# Patient Record
Sex: FEMALE | Race: WHITE | Employment: OTHER | URBAN - METROPOLITAN AREA
[De-identification: names, ages, dates, MRNs, and addresses within clinical notes are randomized per-mention and may not be internally consistent; named-entity substitution may affect disease eponyms.]

---

## 2017-03-29 PROBLEM — J01.90 ACUTE SINUSITIS, RECURRENCE NOT SPECIFIED, UNSPECIFIED LOCATION: Status: ACTIVE | Noted: 2017-03-29

## 2018-01-08 PROBLEM — J01.90 ACUTE SINUSITIS, RECURRENCE NOT SPECIFIED, UNSPECIFIED LOCATION: Status: RESOLVED | Noted: 2017-03-29 | Resolved: 2018-01-08

## 2018-04-30 PROBLEM — H54.7 POOR VISION: Status: ACTIVE | Noted: 2018-04-30

## 2018-06-19 PROBLEM — H25.13 NUCLEAR SENILE CATARACT OF BOTH EYES: Status: ACTIVE | Noted: 2018-06-19

## 2018-07-18 PROBLEM — H25.13 NUCLEAR SENILE CATARACT OF BOTH EYES: Status: RESOLVED | Noted: 2018-06-19 | Resolved: 2018-07-18

## 2018-07-18 PROBLEM — M16.12 PRIMARY OSTEOARTHRITIS OF LEFT HIP: Status: ACTIVE | Noted: 2018-07-18

## 2018-07-18 PROBLEM — H54.7 POOR VISION: Status: RESOLVED | Noted: 2018-04-30 | Resolved: 2018-07-18

## 2018-07-18 PROCEDURE — 87081 CULTURE SCREEN ONLY: CPT | Performed by: ORTHOPAEDIC SURGERY

## 2018-07-31 ENCOUNTER — LAB ENCOUNTER (OUTPATIENT)
Dept: LAB | Age: 83
End: 2018-07-31
Attending: ORTHOPAEDIC SURGERY
Payer: MEDICARE

## 2018-07-31 DIAGNOSIS — Z01.818 PREOP TESTING: ICD-10-CM

## 2018-07-31 LAB
ANTIBODY SCREEN: NEGATIVE
RH BLOOD TYPE: NEGATIVE

## 2018-07-31 PROCEDURE — 86900 BLOOD TYPING SEROLOGIC ABO: CPT

## 2018-07-31 PROCEDURE — 86850 RBC ANTIBODY SCREEN: CPT

## 2018-07-31 PROCEDURE — 86901 BLOOD TYPING SEROLOGIC RH(D): CPT

## 2018-07-31 PROCEDURE — 36415 COLL VENOUS BLD VENIPUNCTURE: CPT

## 2018-08-15 NOTE — H&P
[facility]    PATIENT'S NAME: Mir Thea   ATTENDING PHYSICIAN: Clem Marrero MD   PATIENT ACCOUNT#:   [id number]    LOCATION:  [place] [address] #:   [id number]       YOB: 1935  ADMISSION DATE:       08/16/2 NEUROLOGIC:  She is neurologically intact distally. SKIN:  Intact. Radiographs demonstrate end-stage primary osteoarthritis of the left hip with decreased joint space and large osteophytes surrounding the acetabulum and femoral neck.   Minimal leg le

## 2018-08-16 ENCOUNTER — ANESTHESIA EVENT (OUTPATIENT)
Dept: SURGERY | Facility: HOSPITAL | Age: 83
DRG: 470 | End: 2018-08-16
Payer: MEDICARE

## 2018-08-16 ENCOUNTER — ANESTHESIA (OUTPATIENT)
Dept: SURGERY | Facility: HOSPITAL | Age: 83
DRG: 470 | End: 2018-08-16
Payer: MEDICARE

## 2018-08-16 ENCOUNTER — SURGERY (OUTPATIENT)
Age: 83
End: 2018-08-16

## 2018-08-16 ENCOUNTER — HOSPITAL ENCOUNTER (INPATIENT)
Facility: HOSPITAL | Age: 83
LOS: 4 days | Discharge: NURSING, REHAB OR LONG-TERM CARE | DRG: 470 | End: 2018-08-20
Attending: ORTHOPAEDIC SURGERY | Admitting: ORTHOPAEDIC SURGERY
Payer: MEDICARE

## 2018-08-16 ENCOUNTER — APPOINTMENT (OUTPATIENT)
Dept: GENERAL RADIOLOGY | Facility: HOSPITAL | Age: 83
DRG: 470 | End: 2018-08-16
Attending: PHYSICIAN ASSISTANT
Payer: MEDICARE

## 2018-08-16 DIAGNOSIS — M16.12 PRIMARY OSTEOARTHRITIS OF LEFT HIP: ICD-10-CM

## 2018-08-16 DIAGNOSIS — Z01.818 PREOP TESTING: Primary | ICD-10-CM

## 2018-08-16 PROCEDURE — 88311 DECALCIFY TISSUE: CPT | Performed by: ORTHOPAEDIC SURGERY

## 2018-08-16 PROCEDURE — 73501 X-RAY EXAM HIP UNI 1 VIEW: CPT | Performed by: PHYSICIAN ASSISTANT

## 2018-08-16 PROCEDURE — 0SRB0JA REPLACEMENT OF LEFT HIP JOINT WITH SYNTHETIC SUBSTITUTE, UNCEMENTED, OPEN APPROACH: ICD-10-PCS | Performed by: ORTHOPAEDIC SURGERY

## 2018-08-16 PROCEDURE — 97162 PT EVAL MOD COMPLEX 30 MIN: CPT

## 2018-08-16 PROCEDURE — 88305 TISSUE EXAM BY PATHOLOGIST: CPT | Performed by: ORTHOPAEDIC SURGERY

## 2018-08-16 PROCEDURE — 97530 THERAPEUTIC ACTIVITIES: CPT

## 2018-08-16 DEVICE — CONT LONG NEU LINER HH 32X50: Type: IMPLANTABLE DEVICE | Site: HIP | Status: FUNCTIONAL

## 2018-08-16 DEVICE — IMPLANTABLE DEVICE: Type: IMPLANTABLE DEVICE | Site: HIP | Status: FUNCTIONAL

## 2018-08-16 DEVICE — BONE SCREW 6.5X30 SELF-TAP: Type: IMPLANTABLE DEVICE | Site: HIP | Status: FUNCTIONAL

## 2018-08-16 DEVICE — BONE SCREW 6.5X15 SELF-TAP: Type: IMPLANTABLE DEVICE | Site: HIP | Status: FUNCTIONAL

## 2018-08-16 DEVICE — 12/14 COCR FEM HEAD 32MM +0: Type: IMPLANTABLE DEVICE | Site: HIP | Status: FUNCTIONAL

## 2018-08-16 DEVICE — M/L TAP 12.5 EXT: Type: IMPLANTABLE DEVICE | Site: HIP | Status: FUNCTIONAL

## 2018-08-16 RX ORDER — DIPHENHYDRAMINE HYDROCHLORIDE 50 MG/ML
12.5 INJECTION INTRAMUSCULAR; INTRAVENOUS EVERY 4 HOURS PRN
Status: DISCONTINUED | OUTPATIENT
Start: 2018-08-16 | End: 2018-08-18

## 2018-08-16 RX ORDER — MAGNESIUM HYDROXIDE 1200 MG/15ML
LIQUID ORAL CONTINUOUS PRN
Status: DISCONTINUED | OUTPATIENT
Start: 2018-08-16 | End: 2018-08-16

## 2018-08-16 RX ORDER — LABETALOL HYDROCHLORIDE 5 MG/ML
5 INJECTION, SOLUTION INTRAVENOUS EVERY 5 MIN PRN
Status: DISCONTINUED | OUTPATIENT
Start: 2018-08-16 | End: 2018-08-16 | Stop reason: HOSPADM

## 2018-08-16 RX ORDER — CEFAZOLIN SODIUM/WATER 2 G/20 ML
2 SYRINGE (ML) INTRAVENOUS EVERY 8 HOURS
Status: COMPLETED | OUTPATIENT
Start: 2018-08-16 | End: 2018-08-16

## 2018-08-16 RX ORDER — ONDANSETRON 2 MG/ML
4 INJECTION INTRAMUSCULAR; INTRAVENOUS ONCE AS NEEDED
Status: DISCONTINUED | OUTPATIENT
Start: 2018-08-16 | End: 2018-08-16 | Stop reason: HOSPADM

## 2018-08-16 RX ORDER — CEFAZOLIN SODIUM/WATER 2 G/20 ML
2 SYRINGE (ML) INTRAVENOUS ONCE
Status: COMPLETED | OUTPATIENT
Start: 2018-08-16 | End: 2018-08-16

## 2018-08-16 RX ORDER — ACETAMINOPHEN 500 MG
1000 TABLET ORAL ONCE
Status: ON HOLD | COMMUNITY
Start: 2018-08-16 | End: 2018-08-20

## 2018-08-16 RX ORDER — HYDROCODONE BITARTRATE AND ACETAMINOPHEN 5; 325 MG/1; MG/1
1 TABLET ORAL AS NEEDED
Status: DISCONTINUED | OUTPATIENT
Start: 2018-08-16 | End: 2018-08-16 | Stop reason: HOSPADM

## 2018-08-16 RX ORDER — DIPHENHYDRAMINE HYDROCHLORIDE 50 MG/ML
25 INJECTION INTRAMUSCULAR; INTRAVENOUS ONCE AS NEEDED
Status: ACTIVE | OUTPATIENT
Start: 2018-08-16 | End: 2018-08-16

## 2018-08-16 RX ORDER — ATORVASTATIN CALCIUM 10 MG/1
10 TABLET, FILM COATED ORAL DAILY
Status: DISCONTINUED | OUTPATIENT
Start: 2018-08-16 | End: 2018-08-20

## 2018-08-16 RX ORDER — BUPIVACAINE HYDROCHLORIDE 7.5 MG/ML
INJECTION, SOLUTION INTRASPINAL AS NEEDED
Status: DISCONTINUED | OUTPATIENT
Start: 2018-08-16 | End: 2018-08-16 | Stop reason: SURG

## 2018-08-16 RX ORDER — HYDROCODONE BITARTRATE AND ACETAMINOPHEN 10; 325 MG/1; MG/1
2 TABLET ORAL EVERY 4 HOURS PRN
Status: DISCONTINUED | OUTPATIENT
Start: 2018-08-16 | End: 2018-08-17

## 2018-08-16 RX ORDER — GABAPENTIN 300 MG/1
300 CAPSULE ORAL NIGHTLY
Status: DISCONTINUED | OUTPATIENT
Start: 2018-08-16 | End: 2018-08-20

## 2018-08-16 RX ORDER — HYDRALAZINE HYDROCHLORIDE 20 MG/ML
5 INJECTION INTRAMUSCULAR; INTRAVENOUS EVERY 10 MIN PRN
Status: DISCONTINUED | OUTPATIENT
Start: 2018-08-16 | End: 2018-08-16 | Stop reason: HOSPADM

## 2018-08-16 RX ORDER — METOCLOPRAMIDE 10 MG/1
10 TABLET ORAL ONCE
Status: DISCONTINUED | OUTPATIENT
Start: 2018-08-16 | End: 2018-08-16 | Stop reason: HOSPADM

## 2018-08-16 RX ORDER — CELECOXIB 200 MG/1
200 CAPSULE ORAL EVERY 12 HOURS SCHEDULED
Status: DISCONTINUED | OUTPATIENT
Start: 2018-08-17 | End: 2018-08-20

## 2018-08-16 RX ORDER — ONDANSETRON 2 MG/ML
INJECTION INTRAMUSCULAR; INTRAVENOUS AS NEEDED
Status: DISCONTINUED | OUTPATIENT
Start: 2018-08-16 | End: 2018-08-16 | Stop reason: SURG

## 2018-08-16 RX ORDER — NALOXONE HYDROCHLORIDE 0.4 MG/ML
80 INJECTION, SOLUTION INTRAMUSCULAR; INTRAVENOUS; SUBCUTANEOUS AS NEEDED
Status: DISCONTINUED | OUTPATIENT
Start: 2018-08-16 | End: 2018-08-16 | Stop reason: HOSPADM

## 2018-08-16 RX ORDER — DOCUSATE SODIUM 100 MG/1
100 CAPSULE, LIQUID FILLED ORAL 2 TIMES DAILY
Status: DISCONTINUED | OUTPATIENT
Start: 2018-08-16 | End: 2018-08-20

## 2018-08-16 RX ORDER — SODIUM CHLORIDE 0.9 % (FLUSH) 0.9 %
10 SYRINGE (ML) INJECTION AS NEEDED
Status: DISCONTINUED | OUTPATIENT
Start: 2018-08-16 | End: 2018-08-20

## 2018-08-16 RX ORDER — NALOXONE HYDROCHLORIDE 0.4 MG/ML
80 INJECTION, SOLUTION INTRAMUSCULAR; INTRAVENOUS; SUBCUTANEOUS AS NEEDED
Status: DISCONTINUED | OUTPATIENT
Start: 2018-08-16 | End: 2018-08-16

## 2018-08-16 RX ORDER — MIDAZOLAM HYDROCHLORIDE 1 MG/ML
INJECTION INTRAMUSCULAR; INTRAVENOUS AS NEEDED
Status: DISCONTINUED | OUTPATIENT
Start: 2018-08-16 | End: 2018-08-16 | Stop reason: SURG

## 2018-08-16 RX ORDER — MORPHINE SULFATE 4 MG/ML
4 INJECTION, SOLUTION INTRAMUSCULAR; INTRAVENOUS EVERY 10 MIN PRN
Status: DISCONTINUED | OUTPATIENT
Start: 2018-08-16 | End: 2018-08-16 | Stop reason: HOSPADM

## 2018-08-16 RX ORDER — POLYETHYLENE GLYCOL 3350 17 G/17G
17 POWDER, FOR SOLUTION ORAL DAILY PRN
Status: DISCONTINUED | OUTPATIENT
Start: 2018-08-16 | End: 2018-08-20

## 2018-08-16 RX ORDER — MORPHINE SULFATE 4 MG/ML
2 INJECTION, SOLUTION INTRAMUSCULAR; INTRAVENOUS EVERY 10 MIN PRN
Status: DISCONTINUED | OUTPATIENT
Start: 2018-08-16 | End: 2018-08-16 | Stop reason: HOSPADM

## 2018-08-16 RX ORDER — BUPIVACAINE HYDROCHLORIDE 7.5 MG/ML
INJECTION, SOLUTION INTRASPINAL AS NEEDED
Status: DISCONTINUED | OUTPATIENT
Start: 2018-08-16 | End: 2018-08-16

## 2018-08-16 RX ORDER — ONDANSETRON 2 MG/ML
4 INJECTION INTRAMUSCULAR; INTRAVENOUS AS NEEDED
Status: DISCONTINUED | OUTPATIENT
Start: 2018-08-16 | End: 2018-08-16 | Stop reason: HOSPADM

## 2018-08-16 RX ORDER — HYDROCODONE BITARTRATE AND ACETAMINOPHEN 5; 325 MG/1; MG/1
2 TABLET ORAL AS NEEDED
Status: DISCONTINUED | OUTPATIENT
Start: 2018-08-16 | End: 2018-08-16 | Stop reason: HOSPADM

## 2018-08-16 RX ORDER — PHENYLEPHRINE HCL 10 MG/ML
VIAL (ML) INJECTION AS NEEDED
Status: DISCONTINUED | OUTPATIENT
Start: 2018-08-16 | End: 2018-08-16 | Stop reason: SURG

## 2018-08-16 RX ORDER — SODIUM PHOSPHATE, DIBASIC AND SODIUM PHOSPHATE, MONOBASIC 7; 19 G/133ML; G/133ML
1 ENEMA RECTAL ONCE AS NEEDED
Status: COMPLETED | OUTPATIENT
Start: 2018-08-16 | End: 2018-08-18

## 2018-08-16 RX ORDER — ONDANSETRON 2 MG/ML
4 INJECTION INTRAMUSCULAR; INTRAVENOUS EVERY 4 HOURS PRN
Status: DISCONTINUED | OUTPATIENT
Start: 2018-08-16 | End: 2018-08-20

## 2018-08-16 RX ORDER — ACETAMINOPHEN 500 MG
1000 TABLET ORAL ONCE
Status: DISCONTINUED | OUTPATIENT
Start: 2018-08-16 | End: 2018-08-16 | Stop reason: HOSPADM

## 2018-08-16 RX ORDER — ACETAMINOPHEN 500 MG
1000 TABLET ORAL ONCE AS NEEDED
Status: DISCONTINUED | OUTPATIENT
Start: 2018-08-16 | End: 2018-08-16 | Stop reason: HOSPADM

## 2018-08-16 RX ORDER — METOPROLOL TARTRATE 5 MG/5ML
2.5 INJECTION INTRAVENOUS ONCE
Status: DISCONTINUED | OUTPATIENT
Start: 2018-08-16 | End: 2018-08-16 | Stop reason: HOSPADM

## 2018-08-16 RX ORDER — SENNOSIDES 8.6 MG
17.2 TABLET ORAL NIGHTLY
Status: DISCONTINUED | OUTPATIENT
Start: 2018-08-16 | End: 2018-08-20

## 2018-08-16 RX ORDER — DIPHENHYDRAMINE HCL 25 MG
25 CAPSULE ORAL EVERY 4 HOURS PRN
Status: DISCONTINUED | OUTPATIENT
Start: 2018-08-16 | End: 2018-08-20

## 2018-08-16 RX ORDER — BISACODYL 10 MG
10 SUPPOSITORY, RECTAL RECTAL
Status: DISCONTINUED | OUTPATIENT
Start: 2018-08-16 | End: 2018-08-20

## 2018-08-16 RX ORDER — ASPIRIN 325 MG
325 TABLET ORAL 2 TIMES DAILY
Status: DISCONTINUED | OUTPATIENT
Start: 2018-08-16 | End: 2018-08-20

## 2018-08-16 RX ORDER — MORPHINE SULFATE 10 MG/ML
6 INJECTION, SOLUTION INTRAMUSCULAR; INTRAVENOUS EVERY 10 MIN PRN
Status: DISCONTINUED | OUTPATIENT
Start: 2018-08-16 | End: 2018-08-16 | Stop reason: HOSPADM

## 2018-08-16 RX ORDER — HYDROCODONE BITARTRATE AND ACETAMINOPHEN 10; 325 MG/1; MG/1
1 TABLET ORAL EVERY 4 HOURS PRN
Status: DISCONTINUED | OUTPATIENT
Start: 2018-08-16 | End: 2018-08-17

## 2018-08-16 RX ORDER — FAMOTIDINE 20 MG/1
20 TABLET ORAL ONCE
Status: DISCONTINUED | OUTPATIENT
Start: 2018-08-16 | End: 2018-08-16 | Stop reason: HOSPADM

## 2018-08-16 RX ORDER — METOCLOPRAMIDE HYDROCHLORIDE 5 MG/ML
10 INJECTION INTRAMUSCULAR; INTRAVENOUS EVERY 6 HOURS PRN
Status: DISCONTINUED | OUTPATIENT
Start: 2018-08-16 | End: 2018-08-18

## 2018-08-16 RX ORDER — SODIUM CHLORIDE, SODIUM LACTATE, POTASSIUM CHLORIDE, CALCIUM CHLORIDE 600; 310; 30; 20 MG/100ML; MG/100ML; MG/100ML; MG/100ML
INJECTION, SOLUTION INTRAVENOUS CONTINUOUS
Status: DISCONTINUED | OUTPATIENT
Start: 2018-08-16 | End: 2018-08-16 | Stop reason: HOSPADM

## 2018-08-16 RX ORDER — SODIUM CHLORIDE, SODIUM LACTATE, POTASSIUM CHLORIDE, CALCIUM CHLORIDE 600; 310; 30; 20 MG/100ML; MG/100ML; MG/100ML; MG/100ML
INJECTION, SOLUTION INTRAVENOUS CONTINUOUS
Status: DISCONTINUED | OUTPATIENT
Start: 2018-08-16 | End: 2018-08-17

## 2018-08-16 RX ADMIN — PHENYLEPHRINE HCL 100 MCG: 10 MG/ML VIAL (ML) INJECTION at 08:25:00

## 2018-08-16 RX ADMIN — PHENYLEPHRINE HCL 100 MCG: 10 MG/ML VIAL (ML) INJECTION at 08:11:00

## 2018-08-16 RX ADMIN — CEFAZOLIN SODIUM/WATER 2 G: 2 G/20 ML SYRINGE (ML) INTRAVENOUS at 07:45:00

## 2018-08-16 RX ADMIN — BUPIVACAINE HYDROCHLORIDE 1.2 ML: 7.5 INJECTION, SOLUTION INTRASPINAL at 07:39:00

## 2018-08-16 RX ADMIN — MIDAZOLAM HYDROCHLORIDE 2 MG: 1 INJECTION INTRAMUSCULAR; INTRAVENOUS at 07:26:00

## 2018-08-16 RX ADMIN — PHENYLEPHRINE HCL 100 MCG: 10 MG/ML VIAL (ML) INJECTION at 07:45:00

## 2018-08-16 RX ADMIN — SODIUM CHLORIDE, SODIUM LACTATE, POTASSIUM CHLORIDE, CALCIUM CHLORIDE: 600; 310; 30; 20 INJECTION, SOLUTION INTRAVENOUS at 09:08:00

## 2018-08-16 RX ADMIN — ONDANSETRON 4 MG: 2 INJECTION INTRAMUSCULAR; INTRAVENOUS at 08:56:00

## 2018-08-16 NOTE — CM/SW NOTE
STEVE met with the pt. And her dtr At bedside. The pt. Lives alone in a 1st floor senior apartment with no stairs. The pt. Reports being independent prior to admission with adls, ambulation and driving. The pt. Has 2 children, 1 in the area. The pt.  Is pl

## 2018-08-16 NOTE — ANESTHESIA POSTPROCEDURE EVALUATION
Patient: Julius Rose    Procedure Summary     Date:  08/16/18 Room / Location:  [address] [place] [place] [address] MAIN OR 12 / [address] [place] [address] MAIN OR    Anesthesia Start:  8305 Anesthesia Stop:      Procedure:  HIP TOTAL REPLACEMENT (Left Hip) Diagnosis:       Primary osteoarthritis of left Melolabial Transposition Flap Text: The defect edges were debeveled with a #15 scalpel blade.  Given the location of the defect and the proximity to free margins a melolabial flap was deemed most appropriate.  Using a sterile surgical marker, an appropriate melolabial transposition flap was drawn incorporating the defect.    The area thus outlined was incised deep to adipose tissue with a #15 scalpel blade.  The skin margins were undermined to an appropriate distance in all directions utilizing iris scissors.

## 2018-08-16 NOTE — ANESTHESIA PROCEDURE NOTES
Spinal Block  Performed by: Michael Jacob by: Claude Boas     Patient Location:  OR  Start Time:  8/16/2018 7:35 AM  End Time:  8/16/2018 7:40 AM  Site identification: surface landmarks    Reason for Block: surgical anesthesia    Anesth

## 2018-08-16 NOTE — INTERVAL H&P NOTE
Pre-op Diagnosis: Primary osteoarthritis of left hip [M16.12]    The above referenced H&P was reviewed by Bridget Jarquin MD on 8/16/2018, the patient was examined and no significant changes have occurred in the patient's condition since the H&P was per

## 2018-08-16 NOTE — PHYSICAL THERAPY NOTE
PHYSICAL THERAPY HIP EVALUATION - INPATIENT     Room Number: 203/041-L  Evaluation Date: 8/16/2018  Type of Evaluation: Initial  Physician Order: PT Eval and Treat    Presenting Problem: L LINDSEY - Posterior  Reason for Therapy: Mobility Dysfunction and Disch Medical History  Past Medical History:   Diagnosis Date   • Anxiety state    • Cataract    • High cholesterol    • Osteoarthritis    • Pneumonia due to organism 2017   • Pure hypercholesterolemia        Past Surgical History  Past Surgical History:  No manuel room?: A Little   -   Climbing 3-5 steps with a railing?: A Little     AM-PAC Score:  Raw Score: 16   PT Approx Degree of Impairment Score: 54.16%   Standardized Score (AM-PAC Scale): 40.78   CMS Modifier (G-Code): CK    FUNCTIONAL ABILITY STATUS  Gait Ass

## 2018-08-16 NOTE — BRIEF OP NOTE
Pre-Operative Diagnosis: Primary osteoarthritis of left hip [M16.12]     Post-Operative Diagnosis: Primary osteoarthritis of left hip [M16.12]      Procedure Performed:   Procedure(s):  LEFT TOTAL HIP ARTHROPLASTY    Surgeon(s) and Role:     * Reuben Farrell

## 2018-08-16 NOTE — H&P
[facility] Hospitalist Team  History and Physical     ASSESSMENT / PLAN:   81 yo female with hx S/P cataracts surgery, HL and OA who presents for hip surgery.  Pt is S/P Left Total Hip Arthroplasty, see below for details    S/P Left Total Hip Arthroplasty  -IV/PO BS+; nondistended; non tender;no masses;   GENITAL/: lynn   EXTREMITIES: left hip dressing D/I, no edema; no cyanosis;, no calf tenderness; peripheral pulses intact     PMH  Past Medical History:   Diagnosis Date   • Anxiety state    • Cataract    • Hig results for input(s): ALT, AST, ALB, AMYLASE, LIPASE, LDH in the last 168 hours. Invalid input(s): ALPHOS, TBIL, DBIL, TPROT    No results for input(s): TROP in the last 168 hours.     Radiology: Xr Hip W Or Wo Pelvis 1 View, Left (cpt=73501)    Result D

## 2018-08-16 NOTE — OPERATIVE REPORT
[facility]    PATIENT'S NAME: Shyam Davis   ATTENDING PHYSICIAN: Clem Scales MD   OPERATING PHYSICIAN: Clem Scales MD   PATIENT ACCOUNT#:   [id number]    LOCATION:  [facility] [address] PACU [facility] 10  MEDICAL RECORD #:   [id number] catheter followed by being placed in a left side superolateral position with all pressure points well padded. Time-out was performed. The left lower extremity was prepped and draped in usual sterile fashion.   The procedure was begun with a 10 cm incision serial broaching. Broaching was taken up to a 12.5 where a good solid fit was achieved. No calcar planing was necessary.   With the 12.5 M/L Taper stem as a trial, a variety of neck lengths and offsets were used, and it was determined to restore the cooper

## 2018-08-16 NOTE — ANESTHESIA PREPROCEDURE EVALUATION
Anesthesia PreOp Note    HPI:     Jigna Dykes is a 82year old female who presents for preoperative consultation requested by: Regina Melo MD    Date of Surgery: 8/16/2018    Procedure(s):  HIP TOTAL REPLACEMENT  Indication: Primary osteoarthr MD OLIVIA    Metoclopramide HCl (REGLAN) tab 10 mg 10 mg Oral Once Rosseau, Ceola Paget, MD    ceFAZolin sodium (ANCEF/KEFZOL) 2 GM/20ML premix IV syringe 2 g 2 g Intravenous Once Clem Farrell MD    Tranexamic Acid (CYKLOKAPRON) 3,000 mg in sodium chlorid Anesthesia ROS/Med Hx and Physical Exam     Patient summary reviewed and Nursing notes reviewed    Airway   Mallampati: I  TM distance: >3 FB  Neck ROM: full  Dental - normal exam     Pulmonary - normal exam   (+) COPD,   Cardiovascular - normal exam  Exer

## 2018-08-17 ENCOUNTER — APPOINTMENT (OUTPATIENT)
Dept: GENERAL RADIOLOGY | Facility: HOSPITAL | Age: 83
DRG: 470 | End: 2018-08-17
Attending: NURSE PRACTITIONER
Payer: MEDICARE

## 2018-08-17 LAB
ANION GAP SERPL CALC-SCNC: 7 MMOL/L (ref 0–18)
BUN SERPL-MCNC: 19 MG/DL (ref 8–20)
BUN/CREAT SERPL: 24.1 (ref 10–20)
CALCIUM SERPL-MCNC: 8.4 MG/DL (ref 8.5–10.5)
CHLORIDE SERPL-SCNC: 104 MMOL/L (ref 95–110)
CO2 SERPL-SCNC: 27 MMOL/L (ref 22–32)
CREAT SERPL-MCNC: 0.79 MG/DL (ref 0.5–1.5)
ERYTHROCYTE [DISTWIDTH] IN BLOOD BY AUTOMATED COUNT: 14.6 % (ref 11–15)
GLUCOSE SERPL-MCNC: 133 MG/DL (ref 70–99)
HCT VFR BLD AUTO: 33.7 % (ref 35–48)
HGB BLD-MCNC: 11.2 G/DL (ref 12–16)
MCH RBC QN AUTO: 29.9 PG (ref 27–32)
MCHC RBC AUTO-ENTMCNC: 33.3 G/DL (ref 32–37)
MCV RBC AUTO: 89.7 FL (ref 80–100)
OSMOLALITY UR CALC.SUM OF ELEC: 290 MOSM/KG (ref 275–295)
PLATELET # BLD AUTO: 235 K/UL (ref 140–400)
PMV BLD AUTO: 9.6 FL (ref 7.4–10.3)
POTASSIUM SERPL-SCNC: 4.4 MMOL/L (ref 3.3–5.1)
RBC # BLD AUTO: 3.76 M/UL (ref 3.7–5.4)
SODIUM SERPL-SCNC: 138 MMOL/L (ref 136–144)
WBC # BLD AUTO: 8 K/UL (ref 4–11)

## 2018-08-17 PROCEDURE — 85027 COMPLETE CBC AUTOMATED: CPT | Performed by: PHYSICIAN ASSISTANT

## 2018-08-17 PROCEDURE — 97116 GAIT TRAINING THERAPY: CPT

## 2018-08-17 PROCEDURE — 97110 THERAPEUTIC EXERCISES: CPT

## 2018-08-17 PROCEDURE — 80048 BASIC METABOLIC PNL TOTAL CA: CPT | Performed by: PHYSICIAN ASSISTANT

## 2018-08-17 PROCEDURE — 71046 X-RAY EXAM CHEST 2 VIEWS: CPT | Performed by: NURSE PRACTITIONER

## 2018-08-17 PROCEDURE — 97166 OT EVAL MOD COMPLEX 45 MIN: CPT

## 2018-08-17 PROCEDURE — 97535 SELF CARE MNGMENT TRAINING: CPT

## 2018-08-17 PROCEDURE — 94640 AIRWAY INHALATION TREATMENT: CPT

## 2018-08-17 RX ORDER — HYDROCODONE BITARTRATE AND ACETAMINOPHEN 5; 325 MG/1; MG/1
1 TABLET ORAL EVERY 4 HOURS PRN
Status: DISCONTINUED | OUTPATIENT
Start: 2018-08-17 | End: 2018-08-20

## 2018-08-17 RX ORDER — IPRATROPIUM BROMIDE AND ALBUTEROL SULFATE 2.5; .5 MG/3ML; MG/3ML
3 SOLUTION RESPIRATORY (INHALATION) EVERY 6 HOURS PRN
Status: DISCONTINUED | OUTPATIENT
Start: 2018-08-17 | End: 2018-08-20

## 2018-08-17 RX ORDER — TRAMADOL HYDROCHLORIDE 50 MG/1
50 TABLET ORAL EVERY 6 HOURS PRN
Status: DISCONTINUED | OUTPATIENT
Start: 2018-08-17 | End: 2018-08-20

## 2018-08-17 NOTE — PROGRESS NOTES
[facility] Hospitalist Team  Progress Note    Bakari Kam Patient Status:  Inpatient    1935 MRN [id number]   Location [facility] 4W/SW/SE Attending Colby Jarquin MD   Hosp Day # 1 PCP Anthony Rogers MD     CC: Follow Up  PCP: Lynn Fletcher 08/17/18   0524   WBC  8.0   HGB  11.2*   MCV  89.7   PLT  235       Recent Labs   Lab  08/17/18   0524   NA  138   K  4.4   CL  104   CO2  27   BUN  19   CREATSERUM  0.79   CA  8.4*   GLU  133*       No results for input(s): ALT, AST, ALB, AMYLASE, LIPASE Dictated by (CST): Patt Olszewski, MD on 8/16/2018 at 9:50     Approved by (CST): Patt Olszewski, MD on 8/16/2018 at 9:51              SEE ATTENDING NOTE BELOW:   Patient seen and examined independently. Discussed with APN and agree with note above.     S: p

## 2018-08-17 NOTE — PHYSICAL THERAPY NOTE
PHYSICAL THERAPY HIP TREATMENT NOTE - INPATIENT    Room Number: 472/319-L            Presenting Problem: L LINDSEY - Posterior    Problem List  Principal Problem:    Primary osteoarthritis of left hip  Active Problems:    Preop testing      ASSESSMENT   Mu -   Sitting down on and standing up from a chair with arms (e.g., wheelchair, bedside commode, etc.): A Little   -   Moving from lying on back to sitting on the side of the bed?: A Little   How much help from another person does the patient currently nee independently performs home exercise program for ROM/strengthening per the instructions provided in preparation for discharge.    Goal #4   Current Status IN PROGRESS   Goal #5 Patient verbalizes and/or demonstrates all precautions and safety concerns indep

## 2018-08-17 NOTE — PROGRESS NOTES
POD#1 s/p R LINDSEY  Pain well controlled on orals  No nausea  Ohara DC'ed    Xray reviewed  Labs with mild acute nausea - tolerated    Dressing clean and dry  Calf's non tender, DNVI    Imp: POD#1 s/p Right LINDSEY - stable    Plan: Rehab as per posterior hip pre

## 2018-08-17 NOTE — PLAN OF CARE
DISCHARGE PLANNING    • Discharge to home or other facility with appropriate resources Progressing        HEMATOLOGIC - ADULT    • Free from bleeding injury Progressing        MUSCULOSKELETAL - ADULT    • Return mobility to safest level of function Progres use.

## 2018-08-17 NOTE — OCCUPATIONAL THERAPY NOTE
OCCUPATIONAL THERAPY EVALUATION - INPATIENT      Room Number: 418/418-A  Evaluation Date: 8/17/2018  Type of Evaluation: Initial  Presenting Problem:  (s/p L LINDSEY)    Physician Order: IP Consult to Occupational Therapy  Reason for Therapy: ADL/IADL Dysfunct to organism 2017   • Pure hypercholesterolemia        Past Surgical History  Past Surgical History:  No date: CATARACT  08/16/2018: HIP REPLACEMENT SURGERY Left      Comment: Dr Cheryl Fiore  No date: HYSTERECTOMY    HOME SITUATION  Type of Home: Apartment Maxwell Felipe Score (AM-PAC Scale): 42.03  CMS Modifier (G-Code): CJ    FUNCTIONAL TRANSFER ASSESSMENT  Supine to Sit : Not tested  Sit to Stand:  (CGA)  Toilet Transfer: CGA  Shower Transfer: NT  Chair Transfer: CGA  Car Transfer: NT    Bedroom Mobility: CGA with use o

## 2018-08-18 LAB
ANION GAP SERPL CALC-SCNC: 5 MMOL/L (ref 0–18)
BUN SERPL-MCNC: 16 MG/DL (ref 8–20)
BUN/CREAT SERPL: 21.3 (ref 10–20)
CALCIUM SERPL-MCNC: 8.5 MG/DL (ref 8.5–10.5)
CHLORIDE SERPL-SCNC: 103 MMOL/L (ref 95–110)
CO2 SERPL-SCNC: 28 MMOL/L (ref 22–32)
CREAT SERPL-MCNC: 0.75 MG/DL (ref 0.5–1.5)
ERYTHROCYTE [DISTWIDTH] IN BLOOD BY AUTOMATED COUNT: 14.5 % (ref 11–15)
GLUCOSE SERPL-MCNC: 113 MG/DL (ref 70–99)
HCT VFR BLD AUTO: 33.2 % (ref 35–48)
HGB BLD-MCNC: 10.9 G/DL (ref 12–16)
MCH RBC QN AUTO: 29.9 PG (ref 27–32)
MCHC RBC AUTO-ENTMCNC: 32.8 G/DL (ref 32–37)
MCV RBC AUTO: 91.4 FL (ref 80–100)
OSMOLALITY UR CALC.SUM OF ELEC: 284 MOSM/KG (ref 275–295)
PLATELET # BLD AUTO: 209 K/UL (ref 140–400)
PMV BLD AUTO: 9.1 FL (ref 7.4–10.3)
POTASSIUM SERPL-SCNC: 4.3 MMOL/L (ref 3.3–5.1)
RBC # BLD AUTO: 3.63 M/UL (ref 3.7–5.4)
SODIUM SERPL-SCNC: 136 MMOL/L (ref 136–144)
WBC # BLD AUTO: 8.9 K/UL (ref 4–11)

## 2018-08-18 PROCEDURE — 97110 THERAPEUTIC EXERCISES: CPT

## 2018-08-18 PROCEDURE — 97116 GAIT TRAINING THERAPY: CPT

## 2018-08-18 PROCEDURE — A4216 STERILE WATER/SALINE, 10 ML: HCPCS | Performed by: PHYSICIAN ASSISTANT

## 2018-08-18 PROCEDURE — 85027 COMPLETE CBC AUTOMATED: CPT | Performed by: PHYSICIAN ASSISTANT

## 2018-08-18 PROCEDURE — 80048 BASIC METABOLIC PNL TOTAL CA: CPT | Performed by: NURSE PRACTITIONER

## 2018-08-18 NOTE — PROGRESS NOTES
[facility] Hospitalist Team  Progress Note    Shereen Crooks Patient Status:  Inpatient    1935 MRN [id number]   Location [facility] 4W/SW/SE Attending Jammie Linton MD   Hosp Day # 2 PCP Jamie Kilpatrick MD     CC: Follow Up  PCP: Mag Pena 136   K  4.4  4.3   CL  104  103   CO2  27  28   BUN  19  16   CREATSERUM  0.79  0.75   CA  8.4*  8.5   GLU  133*  113*       No results for input(s): ALT, AST, ALB, AMYLASE, LIPASE, LDH in the last 168 hours.     Invalid input(s): ALPHOS, TBIL, DBIL, TPRO immediate complication.      Dictated by (CST): Gloria Renee MD on 8/16/2018 at 9:50     Approved by (CST): Gloria Renee MD on 8/16/2018 at 9:51

## 2018-08-18 NOTE — PHYSICAL THERAPY NOTE
PHYSICAL THERAPY HIP TREATMENT NOTE - INPATIENT    Room Number: 741/127-R            Presenting Problem: L LINDSEY - Posterior    Problem List  Principal Problem:    Primary osteoarthritis of left hip  Active Problems:    Preop testing      ASSESSMENT   Pt was Fair  ACTIVITY TOLERANCE  Room air    AM-PAC '6-Clicks' INPATIENT SHORT FORM - BASIC MOBILITY  How much difficulty does the patient currently have. ..  -   Turning over in bed (including adjusting bedclothes, sheets and blankets)?: A Little   -   Sitting do Patient is able to ambulate 300 feet with assistive device at assistance level: modified independent    Goal #3   Current Status Amb 120 ft x 2 w/ RW & CGA   Goal #4 Patient independently performs home exercise program for ROM/strengthening per the instruc

## 2018-08-18 NOTE — PROGRESS NOTES
POD#2 s/p Left LINDSEY  Pain well controlled on orals  Rehab progressing    Labs stable    Dressing clean and dry  Calf's non tender, DNVI    Imp: Good POD#2 s/p Left LINDSEY    Plan: Awaiting rehab placement  Cont PT with posterior hip precautions  325 ASA for 4

## 2018-08-19 ENCOUNTER — APPOINTMENT (OUTPATIENT)
Dept: CT IMAGING | Facility: HOSPITAL | Age: 83
DRG: 470 | End: 2018-08-19
Attending: HOSPITALIST
Payer: MEDICARE

## 2018-08-19 ENCOUNTER — APPOINTMENT (OUTPATIENT)
Dept: ULTRASOUND IMAGING | Facility: HOSPITAL | Age: 83
DRG: 470 | End: 2018-08-19
Attending: HOSPITALIST
Payer: MEDICARE

## 2018-08-19 LAB
ANION GAP SERPL CALC-SCNC: 6 MMOL/L (ref 0–18)
BUN SERPL-MCNC: 19 MG/DL (ref 8–20)
BUN/CREAT SERPL: 22.6 (ref 10–20)
CALCIUM SERPL-MCNC: 8.6 MG/DL (ref 8.5–10.5)
CHLORIDE SERPL-SCNC: 98 MMOL/L (ref 95–110)
CO2 SERPL-SCNC: 29 MMOL/L (ref 22–32)
CREAT SERPL-MCNC: 0.84 MG/DL (ref 0.5–1.5)
D DIMER PPP FEU-MCNC: 2.95 MCG/ML (ref ?–0.81)
ERYTHROCYTE [DISTWIDTH] IN BLOOD BY AUTOMATED COUNT: 14.5 % (ref 11–15)
GLUCOSE SERPL-MCNC: 142 MG/DL (ref 70–99)
HCT VFR BLD AUTO: 35 % (ref 35–48)
HGB BLD-MCNC: 11.4 G/DL (ref 12–16)
MCH RBC QN AUTO: 29.7 PG (ref 27–32)
MCHC RBC AUTO-ENTMCNC: 32.6 G/DL (ref 32–37)
MCV RBC AUTO: 91 FL (ref 80–100)
OSMOLALITY UR CALC.SUM OF ELEC: 281 MOSM/KG (ref 275–295)
PLATELET # BLD AUTO: 228 K/UL (ref 140–400)
PMV BLD AUTO: 9.1 FL (ref 7.4–10.3)
POTASSIUM SERPL-SCNC: 4.5 MMOL/L (ref 3.3–5.1)
RBC # BLD AUTO: 3.85 M/UL (ref 3.7–5.4)
SODIUM SERPL-SCNC: 133 MMOL/L (ref 136–144)
WBC # BLD AUTO: 9.9 K/UL (ref 4–11)

## 2018-08-19 PROCEDURE — 97116 GAIT TRAINING THERAPY: CPT

## 2018-08-19 PROCEDURE — 85379 FIBRIN DEGRADATION QUANT: CPT | Performed by: HOSPITALIST

## 2018-08-19 PROCEDURE — 97110 THERAPEUTIC EXERCISES: CPT

## 2018-08-19 PROCEDURE — 71260 CT THORAX DX C+: CPT | Performed by: HOSPITALIST

## 2018-08-19 PROCEDURE — 85027 COMPLETE CBC AUTOMATED: CPT | Performed by: PHYSICIAN ASSISTANT

## 2018-08-19 PROCEDURE — 80048 BASIC METABOLIC PNL TOTAL CA: CPT | Performed by: HOSPITALIST

## 2018-08-19 PROCEDURE — 93970 EXTREMITY STUDY: CPT | Performed by: HOSPITALIST

## 2018-08-19 PROCEDURE — A4216 STERILE WATER/SALINE, 10 ML: HCPCS | Performed by: PHYSICIAN ASSISTANT

## 2018-08-19 NOTE — PHYSICAL THERAPY NOTE
PHYSICAL THERAPY HIP TREATMENT NOTE - INPATIENT    Room Number: 893/301-Y            Presenting Problem: L LINDSEY - Posterior    Problem List  Principal Problem:    Primary osteoarthritis of left hip  Active Problems:    Preop testing      ASSESSMENT   Pt see (including a wheelchair)?: A Little   -   Need to walk in hospital room?: A Little   -   Climbing 3-5 steps with a railing?: A Lot     AM-PAC Score:  Raw Score: 17   PT Approx Degree of Impairment Score: 50.57%   Standardized Score (AM-PAC Scale): 42.13 Goal #6  Current Status

## 2018-08-19 NOTE — PROGRESS NOTES
POD #3 s/p Left LINDSEY  Pain well controlled on orals  No nausea  Alert    Labs stable    Dressing clean and dry  Calf's non tender, DNVI    Imp: Ortho stable s/p Left LINDSEY    Plan:  To rehab when bed available  PT with posterior hip precautions  325 ASA for 4

## 2018-08-19 NOTE — PROGRESS NOTES
[facility] Hospitalist Team  Progress Note    Luis Linear Patient Status:  Inpatient    1935 MRN [id number]   Location [facility] 4W/SW/SE Attending Kendall Dorantes MD   Hosp Day # 3 PCP Radha Kenny MD     CC: Follow Up  PCP: Gisell Wall Labs   Lab  08/17/18   0524  08/18/18   0433  08/19/18   1043   NA  138  136  133*   K  4.4  4.3  4.5   CL  104  103  98   CO2  27  28  29   BUN  19  16  19   CREATSERUM  0.79  0.75  0.84   CA  8.4*  8.5  8.6   GLU  133*  113*  142*       No results for in

## 2018-08-20 VITALS
HEIGHT: 62 IN | TEMPERATURE: 98 F | RESPIRATION RATE: 16 BRPM | WEIGHT: 136.88 LBS | BODY MASS INDEX: 25.19 KG/M2 | SYSTOLIC BLOOD PRESSURE: 111 MMHG | OXYGEN SATURATION: 94 % | HEART RATE: 90 BPM | DIASTOLIC BLOOD PRESSURE: 68 MMHG

## 2018-08-20 LAB
ANION GAP SERPL CALC-SCNC: 7 MMOL/L (ref 0–18)
BASOPHILS # BLD: 0.1 K/UL (ref 0–0.2)
BASOPHILS NFR BLD: 1 %
BUN SERPL-MCNC: 13 MG/DL (ref 8–20)
BUN/CREAT SERPL: 16.7 (ref 10–20)
CALCIUM SERPL-MCNC: 8.9 MG/DL (ref 8.5–10.5)
CHLORIDE SERPL-SCNC: 97 MMOL/L (ref 95–110)
CO2 SERPL-SCNC: 29 MMOL/L (ref 22–32)
CREAT SERPL-MCNC: 0.78 MG/DL (ref 0.5–1.5)
EOSINOPHIL # BLD: 0.3 K/UL (ref 0–0.7)
EOSINOPHIL NFR BLD: 4 %
ERYTHROCYTE [DISTWIDTH] IN BLOOD BY AUTOMATED COUNT: 14.7 % (ref 11–15)
GLUCOSE SERPL-MCNC: 104 MG/DL (ref 70–99)
HCT VFR BLD AUTO: 37.4 % (ref 35–48)
HGB BLD-MCNC: 12.3 G/DL (ref 12–16)
LYMPHOCYTES # BLD: 1.8 K/UL (ref 1–4)
LYMPHOCYTES NFR BLD: 19 %
MCH RBC QN AUTO: 29.6 PG (ref 27–32)
MCHC RBC AUTO-ENTMCNC: 32.9 G/DL (ref 32–37)
MCV RBC AUTO: 90.2 FL (ref 80–100)
MONOCYTES # BLD: 1.2 K/UL (ref 0–1)
MONOCYTES NFR BLD: 13 %
NEUTROPHILS # BLD AUTO: 5.9 K/UL (ref 1.8–7.7)
NEUTROPHILS NFR BLD: 64 %
OSMOLALITY UR CALC.SUM OF ELEC: 276 MOSM/KG (ref 275–295)
PLATELET # BLD AUTO: 334 K/UL (ref 140–400)
PMV BLD AUTO: 9.2 FL (ref 7.4–10.3)
POTASSIUM SERPL-SCNC: 4.5 MMOL/L (ref 3.3–5.1)
RBC # BLD AUTO: 4.15 M/UL (ref 3.7–5.4)
SODIUM SERPL-SCNC: 133 MMOL/L (ref 136–144)
WBC # BLD AUTO: 9.2 K/UL (ref 4–11)

## 2018-08-20 PROCEDURE — 97110 THERAPEUTIC EXERCISES: CPT

## 2018-08-20 PROCEDURE — 85025 COMPLETE CBC W/AUTO DIFF WBC: CPT | Performed by: NURSE PRACTITIONER

## 2018-08-20 PROCEDURE — 97530 THERAPEUTIC ACTIVITIES: CPT

## 2018-08-20 PROCEDURE — 80048 BASIC METABOLIC PNL TOTAL CA: CPT | Performed by: NURSE PRACTITIONER

## 2018-08-20 PROCEDURE — 97535 SELF CARE MNGMENT TRAINING: CPT

## 2018-08-20 PROCEDURE — 97116 GAIT TRAINING THERAPY: CPT

## 2018-08-20 RX ORDER — PSEUDOEPHEDRINE HCL 30 MG
100 TABLET ORAL 2 TIMES DAILY
Qty: 60 CAPSULE | Refills: 0 | Status: SHIPPED | OUTPATIENT
Start: 2018-08-20 | End: 2018-09-19 | Stop reason: ALTCHOICE

## 2018-08-20 RX ORDER — TRAMADOL HYDROCHLORIDE 50 MG/1
50 TABLET ORAL EVERY 6 HOURS PRN
Qty: 30 TABLET | Refills: 0 | Status: SHIPPED | OUTPATIENT
Start: 2018-08-20 | End: 2018-09-19 | Stop reason: ALTCHOICE

## 2018-08-20 RX ORDER — IPRATROPIUM BROMIDE AND ALBUTEROL SULFATE 2.5; .5 MG/3ML; MG/3ML
3 SOLUTION RESPIRATORY (INHALATION) EVERY 6 HOURS PRN
Qty: 7 VIAL | Refills: 0 | Status: SHIPPED | OUTPATIENT
Start: 2018-08-20 | End: 2019-08-14

## 2018-08-20 RX ORDER — POLYETHYLENE GLYCOL 3350 17 G/17G
17 POWDER, FOR SOLUTION ORAL DAILY PRN
Qty: 7 EACH | Refills: 0 | Status: SHIPPED | OUTPATIENT
Start: 2018-08-20 | End: 2018-09-19 | Stop reason: ALTCHOICE

## 2018-08-20 RX ORDER — ASPIRIN 325 MG
325 TABLET ORAL 2 TIMES DAILY
Qty: 60 TABLET | Refills: 0 | Status: SHIPPED | OUTPATIENT
Start: 2018-08-20 | End: 2018-09-19 | Stop reason: ALTCHOICE

## 2018-08-20 NOTE — CM/SW NOTE
8/20Imani is able to accept the Patient  today (8/20) at 1:30 p.m. This Writer informed the Patient's RN of the above.   Jessi Meza informed this Writer that the Patient will be going into a double room and can requested to be waitlisted for a private

## 2018-08-20 NOTE — DISCHARGE SUMMARY
[facility] Hospitalist Discharge Summary   Patient ID:  Edilson Hernandez  [id number]  89 year old  2/1/1935    Admit date: 8/16/2018  Discharge date: 8/20/2018    Primary Care Physician: Stefanie Garcia MD   Attending Physician: Jey Hernandez MD   Consults Wheezing, duonebs ordered  -hx of tobacco abuse  -CXR wnl. D dimer elevated. CT Chest negative for PE, emphysema noted.  US LE negative for DVT  -wean O2 as outpt  -pulmonary follow up as outpt for PFT's    CAD  -coronary calcification noted on CT chest  -c gabapentin 300 MG Caps  Commonly known as:  NEURONTIN  1 capsule the morning prior to surgery, 1 capsule the morning of surgery, 1 capsule at bedtime each evening after surgery. PROBIOTIC OR          Important follow up:   Follow up PCP after discharg

## 2018-08-20 NOTE — OCCUPATIONAL THERAPY NOTE
OCCUPATIONAL THERAPY TREATMENT NOTE - INPATIENT    Room Number: 281/993-W         Presenting Problem:  (s/p L LINDSEY)     Problem List  Principal Problem:    Primary osteoarthritis of left hip      ASSESSMENT   HUNG Perea cleared pt for OT session.  Pt received lower body clothing?: A Little  -   Bathing (including washing, rinsing, drying)?: A Little  -   Toileting, which includes using toilet, bedpan or urinal? : A Little  -   Putting on and taking off regular upper body clothing?: None  -   Taking care of pers

## 2018-08-20 NOTE — PROGRESS NOTES
POD#4 s/p Left LINDSEY  Pain well controlled on orals  Rehab progressing     Labs stable    Dressing clean and dry left hip  Calf's non tender, DNVI    Imp: Stable POD#4 s/p Left LINDSEY    Plan: transfer to [facility] 95 when bed available  Posterior hip precautions  325 AS

## 2018-08-20 NOTE — PHYSICAL THERAPY NOTE
PHYSICAL THERAPY HIP TREATMENT NOTE - INPATIENT    Room Number: 813/940-B            Presenting Problem: L LINDSEY - Posterior    Problem List  Principal Problem:    Primary osteoarthritis of left hip      ASSESSMENT   Pt seen in am session for PT treatment.  P 17   PT Approx Degree of Impairment Score: 50.57%   Standardized Score (AM-PAC Scale): 42.13   CMS Modifier (G-Code): CK    FUNCTIONAL ABILITY STATUS  Gait Assessment   Gait Assistance: Minimum assistance  Distance (ft): 100  Assistive Device: Rolling walk

## 2018-09-03 PROBLEM — Z96.642 HISTORY OF TOTAL LEFT HIP REPLACEMENT: Status: ACTIVE | Noted: 2018-09-03

## 2018-09-03 PROBLEM — Z96.642 STATUS POST TOTAL REPLACEMENT OF LEFT HIP: Status: ACTIVE | Noted: 2018-09-03

## 2018-10-18 PROBLEM — J43.9 EMPHYSEMA LUNG (HCC): Status: ACTIVE | Noted: 2018-10-18

## 2019-03-27 PROCEDURE — 87070 CULTURE OTHR SPECIMN AEROBIC: CPT | Performed by: EMERGENCY MEDICINE

## 2019-03-27 PROCEDURE — 87205 SMEAR GRAM STAIN: CPT | Performed by: EMERGENCY MEDICINE

## 2019-03-27 PROCEDURE — 87075 CULTR BACTERIA EXCEPT BLOOD: CPT | Performed by: EMERGENCY MEDICINE

## 2019-08-14 PROBLEM — R73.09 OTHER ABNORMAL GLUCOSE: Status: ACTIVE | Noted: 2019-08-14

## 2019-08-14 PROBLEM — Z96.642 HISTORY OF TOTAL LEFT HIP REPLACEMENT: Status: RESOLVED | Noted: 2018-09-03 | Resolved: 2019-08-14

## 2019-08-14 PROBLEM — M16.12 PRIMARY OSTEOARTHRITIS OF LEFT HIP: Status: RESOLVED | Noted: 2018-07-18 | Resolved: 2019-08-14

## 2020-09-29 PROBLEM — R73.01 IMPAIRED FASTING GLUCOSE: Status: ACTIVE | Noted: 2020-09-29

## 2020-09-29 PROBLEM — R73.09 OTHER ABNORMAL GLUCOSE: Status: RESOLVED | Noted: 2019-08-14 | Resolved: 2020-09-29

## 2020-09-29 PROBLEM — Z96.642 STATUS POST TOTAL REPLACEMENT OF LEFT HIP: Status: RESOLVED | Noted: 2018-09-03 | Resolved: 2020-09-29

## 2024-06-25 RX ORDER — FLUTICASONE FUROATE, UMECLIDINIUM BROMIDE AND VILANTEROL TRIFENATATE 200; 62.5; 25 UG/1; UG/1; UG/1
1 POWDER RESPIRATORY (INHALATION) DAILY
COMMUNITY

## 2024-06-25 RX ORDER — IPRATROPIUM BROMIDE 42 UG/1
2 SPRAY, METERED NASAL 4 TIMES DAILY
COMMUNITY
End: 2024-06-28

## 2024-06-25 RX ORDER — FUROSEMIDE 20 MG/1
20 TABLET ORAL DAILY
COMMUNITY

## 2024-06-25 RX ORDER — AMLODIPINE BESYLATE 2.5 MG/1
2.5 TABLET ORAL DAILY
COMMUNITY

## 2024-06-25 RX ORDER — ASPIRIN 81 MG/1
81 TABLET ORAL DAILY
COMMUNITY

## 2024-06-25 RX ORDER — CARVEDILOL 25 MG/1
25 TABLET ORAL 2 TIMES DAILY WITH MEALS
COMMUNITY

## 2024-06-28 NOTE — PAT NURSING NOTE
PAT nursing note: strict npo after midnight; shower with antibacterial soap;  continue to take all prescribed medications as directed except: the morning of surgery only take Amlodipine, Aspirin, and Carvedilol with a sip of water; use the morning inhaler; last dose of vitamins, supplements, herbal products and NSAIDs 6/30; denies taking blood thinners, ACEs, ARBs, diabetic or weight loss medications; no PPM, ICD, no nerve or spinal cord stimulator; admit; 2 visitors welcome; park in the [place] at [facility]; register at [facility] am; keep personal possessions to a minimum: bring insurance card(s)/picture ID, slippers, toiletries, wear comfortable clothing, do not wear contacts-bring glasses/eye glass case, bring denture cup/supplies; leave all valuables at home, including jewelry; fall risk; uses oxygen prn; all questions answered; patient verbalized understanding of all instructions; labs/EKG 7/1 @0900/ADO.

## 2024-07-01 ENCOUNTER — EKG ENCOUNTER (OUTPATIENT)
Dept: LAB | Age: 89
End: 2024-07-01
Attending: SURGERY
Payer: MEDICARE

## 2024-07-01 ENCOUNTER — LAB ENCOUNTER (OUTPATIENT)
Dept: LAB | Age: 89
End: 2024-07-01
Attending: SURGERY
Payer: MEDICARE

## 2024-07-01 DIAGNOSIS — Z91.89 AT RISK FOR BLEEDING: ICD-10-CM

## 2024-07-01 DIAGNOSIS — T82.310A TYPE IA ENDOLEAK OF AORTIC GRAFT (HCC): ICD-10-CM

## 2024-07-01 DIAGNOSIS — Z01.818 PRE-OP TESTING: ICD-10-CM

## 2024-07-01 LAB
ALBUMIN SERPL-MCNC: 4.2 G/DL (ref 3.2–4.8)
ALBUMIN/GLOB SERPL: 1.6 {RATIO} (ref 1–2)
ALP LIVER SERPL-CCNC: 110 U/L
ALT SERPL-CCNC: 10 U/L
ANION GAP SERPL CALC-SCNC: 2 MMOL/L (ref 0–18)
ANTIBODY SCREEN: NEGATIVE
APTT PPP: 27.9 SECONDS (ref 23–36)
AST SERPL-CCNC: 19 U/L (ref ?–34)
ATRIAL RATE: 62 BPM
BASOPHILS # BLD AUTO: 0.06 X10(3) UL (ref 0–0.2)
BASOPHILS NFR BLD AUTO: 0.8 %
BILIRUB SERPL-MCNC: 0.6 MG/DL (ref 0.2–1.1)
BUN BLD-MCNC: 21 MG/DL (ref 9–23)
BUN/CREAT SERPL: 16.2 (ref 10–20)
CALCIUM BLD-MCNC: 9.4 MG/DL (ref 8.7–10.4)
CHLORIDE SERPL-SCNC: 109 MMOL/L (ref 98–112)
CO2 SERPL-SCNC: 30 MMOL/L (ref 21–32)
CREAT BLD-MCNC: 1.3 MG/DL
DEPRECATED RDW RBC AUTO: 50.8 FL (ref 35.1–46.3)
EGFRCR SERPLBLD CKD-EPI 2021: 40 ML/MIN/1.73M2 (ref 60–?)
EOSINOPHIL # BLD AUTO: 0.24 X10(3) UL (ref 0–0.7)
EOSINOPHIL NFR BLD AUTO: 3.2 %
ERYTHROCYTE [DISTWIDTH] IN BLOOD BY AUTOMATED COUNT: 14.7 % (ref 11–15)
FASTING STATUS PATIENT QL REPORTED: NO
GLOBULIN PLAS-MCNC: 2.6 G/DL (ref 2–3.5)
GLUCOSE BLD-MCNC: 106 MG/DL (ref 70–99)
HCT VFR BLD AUTO: 38.6 %
HGB BLD-MCNC: 12.1 G/DL
IMM GRANULOCYTES # BLD AUTO: 0.02 X10(3) UL (ref 0–1)
IMM GRANULOCYTES NFR BLD: 0.3 %
INR BLD: 1.06 (ref 0.8–1.2)
LYMPHOCYTES # BLD AUTO: 1.58 X10(3) UL (ref 1–4)
LYMPHOCYTES NFR BLD AUTO: 21.4 %
MCH RBC QN AUTO: 29.2 PG (ref 26–34)
MCHC RBC AUTO-ENTMCNC: 31.3 G/DL (ref 31–37)
MCV RBC AUTO: 93.2 FL
MONOCYTES # BLD AUTO: 0.73 X10(3) UL (ref 0.1–1)
MONOCYTES NFR BLD AUTO: 9.9 %
NEUTROPHILS # BLD AUTO: 4.77 X10 (3) UL (ref 1.5–7.7)
NEUTROPHILS # BLD AUTO: 4.77 X10(3) UL (ref 1.5–7.7)
NEUTROPHILS NFR BLD AUTO: 64.4 %
OSMOLALITY SERPL CALC.SUM OF ELEC: 295 MOSM/KG (ref 275–295)
P AXIS: 19 DEGREES
P-R INTERVAL: 148 MS
PLATELET # BLD AUTO: 334 10(3)UL (ref 150–450)
POTASSIUM SERPL-SCNC: 4.9 MMOL/L (ref 3.5–5.1)
PROT SERPL-MCNC: 6.8 G/DL (ref 5.7–8.2)
PROTHROMBIN TIME: 14.4 SECONDS (ref 11.6–14.8)
Q-T INTERVAL: 430 MS
QRS DURATION: 110 MS
QTC CALCULATION (BEZET): 436 MS
R AXIS: -24 DEGREES
RBC # BLD AUTO: 4.14 X10(6)UL
RH BLOOD TYPE: NEGATIVE
SODIUM SERPL-SCNC: 141 MMOL/L (ref 136–145)
T AXIS: 7 DEGREES
VENTRICULAR RATE: 62 BPM
WBC # BLD AUTO: 7.4 X10(3) UL (ref 4–11)

## 2024-07-01 PROCEDURE — 85730 THROMBOPLASTIN TIME PARTIAL: CPT

## 2024-07-01 PROCEDURE — 85025 COMPLETE CBC W/AUTO DIFF WBC: CPT

## 2024-07-01 PROCEDURE — 85610 PROTHROMBIN TIME: CPT

## 2024-07-01 PROCEDURE — 86901 BLOOD TYPING SEROLOGIC RH(D): CPT

## 2024-07-01 PROCEDURE — 86850 RBC ANTIBODY SCREEN: CPT

## 2024-07-01 PROCEDURE — 93010 ELECTROCARDIOGRAM REPORT: CPT | Performed by: INTERNAL MEDICINE

## 2024-07-01 PROCEDURE — 86900 BLOOD TYPING SEROLOGIC ABO: CPT

## 2024-07-01 PROCEDURE — 36415 COLL VENOUS BLD VENIPUNCTURE: CPT

## 2024-07-01 PROCEDURE — 80053 COMPREHEN METABOLIC PANEL: CPT

## 2024-07-01 PROCEDURE — 93005 ELECTROCARDIOGRAM TRACING: CPT

## 2024-07-08 ENCOUNTER — ANESTHESIA (OUTPATIENT)
Dept: CARDIAC SURGERY | Facility: HOSPITAL | Age: 89
End: 2024-07-08
Payer: MEDICARE

## 2024-07-08 ENCOUNTER — HOSPITAL ENCOUNTER (INPATIENT)
Facility: HOSPITAL | Age: 89
LOS: 2 days | Discharge: HOME | End: 2024-07-10
Attending: SURGERY | Admitting: SURGERY
Payer: MEDICARE

## 2024-07-08 ENCOUNTER — ANESTHESIA EVENT (OUTPATIENT)
Dept: CARDIAC SURGERY | Facility: HOSPITAL | Age: 89
End: 2024-07-08
Payer: MEDICARE

## 2024-07-08 DIAGNOSIS — Z01.818 PRE-OP TESTING: ICD-10-CM

## 2024-07-08 DIAGNOSIS — Z91.89 AT RISK FOR BLEEDING: ICD-10-CM

## 2024-07-08 DIAGNOSIS — T82.310A TYPE IA ENDOLEAK OF AORTIC GRAFT (HCC): Primary | ICD-10-CM

## 2024-07-08 PROCEDURE — 85347 COAGULATION TIME ACTIVATED: CPT

## 2024-07-08 PROCEDURE — B41D1ZZ FLUOROSCOPY OF AORTA AND BILATERAL LOWER EXTREMITY ARTERIES USING LOW OSMOLAR CONTRAST: ICD-10-PCS | Performed by: SURGERY

## 2024-07-08 PROCEDURE — B440ZZ3 ULTRASONOGRAPHY OF ABDOMINAL AORTA, INTRAVASCULAR: ICD-10-PCS | Performed by: SURGERY

## 2024-07-08 PROCEDURE — B44HZZ3 ULTRASONOGRAPHY OF BILATERAL LOWER EXTREMITY ARTERIES, INTRAVASCULAR: ICD-10-PCS | Performed by: SURGERY

## 2024-07-08 PROCEDURE — 04V03FZ RESTRICTION OF ABDOMINAL AORTA WITH BRANCHED OR FENESTRATED INTRALUMINAL DEVICE, THREE OR MORE ARTERIES, PERCUTANEOUS APPROACH: ICD-10-PCS | Performed by: SURGERY

## 2024-07-08 PROCEDURE — 84295 ASSAY OF SERUM SODIUM: CPT

## 2024-07-08 PROCEDURE — 02VW3DZ RESTRICTION OF THORACIC AORTA, DESCENDING WITH INTRALUMINAL DEVICE, PERCUTANEOUS APPROACH: ICD-10-PCS | Performed by: SURGERY

## 2024-07-08 PROCEDURE — 85014 HEMATOCRIT: CPT

## 2024-07-08 PROCEDURE — 82803 BLOOD GASES ANY COMBINATION: CPT

## 2024-07-08 PROCEDURE — 82330 ASSAY OF CALCIUM: CPT

## 2024-07-08 PROCEDURE — 84132 ASSAY OF SERUM POTASSIUM: CPT

## 2024-07-08 DEVICE — IMPLANTABLE DEVICE: Type: IMPLANTABLE DEVICE | Site: ABDOMEN | Status: FUNCTIONAL

## 2024-07-08 DEVICE — IMPLANTABLE DEVICE: Type: IMPLANTABLE DEVICE | Site: AORTA | Status: FUNCTIONAL

## 2024-07-08 RX ORDER — CLOPIDOGREL BISULFATE 75 MG/1
75 TABLET ORAL DAILY
Status: DISCONTINUED | OUTPATIENT
Start: 2024-07-08 | End: 2024-07-10

## 2024-07-08 RX ORDER — PHENYLEPHRINE HCL 10 MG/ML
VIAL (ML) INJECTION AS NEEDED
Status: DISCONTINUED | OUTPATIENT
Start: 2024-07-08 | End: 2024-07-08 | Stop reason: SURG

## 2024-07-08 RX ORDER — LIDOCAINE HYDROCHLORIDE 10 MG/ML
INJECTION, SOLUTION EPIDURAL; INFILTRATION; INTRACAUDAL; PERINEURAL AS NEEDED
Status: DISCONTINUED | OUTPATIENT
Start: 2024-07-08 | End: 2024-07-08 | Stop reason: SURG

## 2024-07-08 RX ORDER — HYDROCODONE BITARTRATE AND ACETAMINOPHEN 5; 325 MG/1; MG/1
1 TABLET ORAL ONCE AS NEEDED
Status: DISPENSED | OUTPATIENT
Start: 2024-07-08 | End: 2024-07-08

## 2024-07-08 RX ORDER — LIDOCAINE HYDROCHLORIDE 10 MG/ML
INJECTION, SOLUTION INFILTRATION; PERINEURAL AS NEEDED
Status: DISCONTINUED | OUTPATIENT
Start: 2024-07-08 | End: 2024-07-08 | Stop reason: HOSPADM

## 2024-07-08 RX ORDER — PROTAMINE SULFATE 10 MG/ML
INJECTION, SOLUTION INTRAVENOUS AS NEEDED
Status: DISCONTINUED | OUTPATIENT
Start: 2024-07-08 | End: 2024-07-08 | Stop reason: SURG

## 2024-07-08 RX ORDER — FUROSEMIDE 20 MG/1
20 TABLET ORAL DAILY
Status: DISCONTINUED | OUTPATIENT
Start: 2024-07-08 | End: 2024-07-10

## 2024-07-08 RX ORDER — NALOXONE HYDROCHLORIDE 0.4 MG/ML
0.08 INJECTION, SOLUTION INTRAMUSCULAR; INTRAVENOUS; SUBCUTANEOUS AS NEEDED
Status: ACTIVE | OUTPATIENT
Start: 2024-07-08 | End: 2024-07-09

## 2024-07-08 RX ORDER — HEPARIN SODIUM 5000 [USP'U]/ML
5000 INJECTION, SOLUTION INTRAVENOUS; SUBCUTANEOUS ONCE
Status: DISCONTINUED | OUTPATIENT
Start: 2024-07-08 | End: 2024-07-08 | Stop reason: HOSPADM

## 2024-07-08 RX ORDER — HYDROCODONE BITARTRATE AND ACETAMINOPHEN 5; 325 MG/1; MG/1
2 TABLET ORAL EVERY 4 HOURS PRN
Status: DISCONTINUED | OUTPATIENT
Start: 2024-07-08 | End: 2024-07-10

## 2024-07-08 RX ORDER — METOCLOPRAMIDE HYDROCHLORIDE 5 MG/ML
10 INJECTION INTRAMUSCULAR; INTRAVENOUS EVERY 8 HOURS PRN
Status: DISCONTINUED | OUTPATIENT
Start: 2024-07-08 | End: 2024-07-10

## 2024-07-08 RX ORDER — CARVEDILOL 12.5 MG/1
25 TABLET ORAL 2 TIMES DAILY WITH MEALS
Status: DISCONTINUED | OUTPATIENT
Start: 2024-07-08 | End: 2024-07-10

## 2024-07-08 RX ORDER — HYDROCODONE BITARTRATE AND ACETAMINOPHEN 5; 325 MG/1; MG/1
2 TABLET ORAL ONCE AS NEEDED
Status: ACTIVE | OUTPATIENT
Start: 2024-07-08 | End: 2024-07-08

## 2024-07-08 RX ORDER — HEPARIN SODIUM 5000 [USP'U]/ML
5000 INJECTION, SOLUTION INTRAVENOUS; SUBCUTANEOUS EVERY 8 HOURS SCHEDULED
Status: DISCONTINUED | OUTPATIENT
Start: 2024-07-09 | End: 2024-07-10

## 2024-07-08 RX ORDER — ONDANSETRON 2 MG/ML
4 INJECTION INTRAMUSCULAR; INTRAVENOUS EVERY 6 HOURS PRN
Status: DISCONTINUED | OUTPATIENT
Start: 2024-07-08 | End: 2024-07-10

## 2024-07-08 RX ORDER — DEXAMETHASONE SODIUM PHOSPHATE 4 MG/ML
4 VIAL (ML) INJECTION AS NEEDED
Status: ACTIVE | OUTPATIENT
Start: 2024-07-08 | End: 2024-07-09

## 2024-07-08 RX ORDER — METOCLOPRAMIDE HYDROCHLORIDE 5 MG/ML
10 INJECTION INTRAMUSCULAR; INTRAVENOUS AS NEEDED
Status: ACTIVE | OUTPATIENT
Start: 2024-07-08 | End: 2024-07-09

## 2024-07-08 RX ORDER — ONDANSETRON 2 MG/ML
4 INJECTION INTRAMUSCULAR; INTRAVENOUS AS NEEDED
Status: ACTIVE | OUTPATIENT
Start: 2024-07-08 | End: 2024-07-09

## 2024-07-08 RX ORDER — ROCURONIUM BROMIDE 10 MG/ML
INJECTION, SOLUTION INTRAVENOUS AS NEEDED
Status: DISCONTINUED | OUTPATIENT
Start: 2024-07-08 | End: 2024-07-08 | Stop reason: SURG

## 2024-07-08 RX ORDER — ATORVASTATIN CALCIUM 10 MG/1
10 TABLET, FILM COATED ORAL DAILY
Status: DISCONTINUED | OUTPATIENT
Start: 2024-07-09 | End: 2024-07-10

## 2024-07-08 RX ORDER — ACETAMINOPHEN 325 MG/1
650 TABLET ORAL EVERY 4 HOURS PRN
Status: DISCONTINUED | OUTPATIENT
Start: 2024-07-08 | End: 2024-07-10

## 2024-07-08 RX ORDER — ASPIRIN 81 MG/1
81 TABLET ORAL DAILY
Status: DISCONTINUED | OUTPATIENT
Start: 2024-07-09 | End: 2024-07-10

## 2024-07-08 RX ORDER — HYDRALAZINE HYDROCHLORIDE 20 MG/ML
INJECTION INTRAMUSCULAR; INTRAVENOUS AS NEEDED
Status: DISCONTINUED | OUTPATIENT
Start: 2024-07-08 | End: 2024-07-08 | Stop reason: SURG

## 2024-07-08 RX ORDER — HEPARIN SODIUM 1000 [USP'U]/ML
INJECTION, SOLUTION INTRAVENOUS; SUBCUTANEOUS AS NEEDED
Status: DISCONTINUED | OUTPATIENT
Start: 2024-07-08 | End: 2024-07-08 | Stop reason: SURG

## 2024-07-08 RX ORDER — HYDROCODONE BITARTRATE AND ACETAMINOPHEN 5; 325 MG/1; MG/1
1 TABLET ORAL EVERY 4 HOURS PRN
Status: DISCONTINUED | OUTPATIENT
Start: 2024-07-08 | End: 2024-07-10

## 2024-07-08 RX ORDER — DEXAMETHASONE SODIUM PHOSPHATE 4 MG/ML
VIAL (ML) INJECTION AS NEEDED
Status: DISCONTINUED | OUTPATIENT
Start: 2024-07-08 | End: 2024-07-08 | Stop reason: SURG

## 2024-07-08 RX ORDER — IODIXANOL 320 MG/ML
100 INJECTION, SOLUTION INTRAVASCULAR
Status: COMPLETED | OUTPATIENT
Start: 2024-07-08 | End: 2024-07-08

## 2024-07-08 RX ORDER — ONDANSETRON 2 MG/ML
INJECTION INTRAMUSCULAR; INTRAVENOUS AS NEEDED
Status: DISCONTINUED | OUTPATIENT
Start: 2024-07-08 | End: 2024-07-08 | Stop reason: SURG

## 2024-07-08 RX ORDER — AMLODIPINE BESYLATE 2.5 MG/1
2.5 TABLET ORAL DAILY
Status: DISCONTINUED | OUTPATIENT
Start: 2024-07-09 | End: 2024-07-10

## 2024-07-08 RX ORDER — SODIUM CHLORIDE 9 MG/ML
INJECTION, SOLUTION INTRAVENOUS CONTINUOUS PRN
Status: DISCONTINUED | OUTPATIENT
Start: 2024-07-08 | End: 2024-07-08 | Stop reason: SURG

## 2024-07-08 RX ORDER — SODIUM CHLORIDE, SODIUM LACTATE, POTASSIUM CHLORIDE, CALCIUM CHLORIDE 600; 310; 30; 20 MG/100ML; MG/100ML; MG/100ML; MG/100ML
INJECTION, SOLUTION INTRAVENOUS CONTINUOUS
Status: DISCONTINUED | OUTPATIENT
Start: 2024-07-08 | End: 2024-07-09

## 2024-07-08 RX ORDER — HYDROMORPHONE HYDROCHLORIDE 1 MG/ML
0.4 INJECTION, SOLUTION INTRAMUSCULAR; INTRAVENOUS; SUBCUTANEOUS EVERY 5 MIN PRN
Status: ACTIVE | OUTPATIENT
Start: 2024-07-08 | End: 2024-07-08

## 2024-07-08 RX ADMIN — SODIUM CHLORIDE: 9 INJECTION, SOLUTION INTRAVENOUS at 19:45:00

## 2024-07-08 RX ADMIN — HYDRALAZINE HYDROCHLORIDE 10 MG: 20 INJECTION INTRAMUSCULAR; INTRAVENOUS at 19:30:00

## 2024-07-08 RX ADMIN — HEPARIN SODIUM 1000 UNITS: 1000 INJECTION, SOLUTION INTRAVENOUS; SUBCUTANEOUS at 16:56:00

## 2024-07-08 RX ADMIN — HEPARIN SODIUM 1000 UNITS: 1000 INJECTION, SOLUTION INTRAVENOUS; SUBCUTANEOUS at 17:37:00

## 2024-07-08 RX ADMIN — HEPARIN SODIUM 1000 UNITS: 1000 INJECTION, SOLUTION INTRAVENOUS; SUBCUTANEOUS at 18:17:00

## 2024-07-08 RX ADMIN — ONDANSETRON 4 MG: 2 INJECTION INTRAMUSCULAR; INTRAVENOUS at 19:15:00

## 2024-07-08 RX ADMIN — ROCURONIUM BROMIDE 10 MG: 10 INJECTION, SOLUTION INTRAVENOUS at 17:46:00

## 2024-07-08 RX ADMIN — HEPARIN SODIUM 8000 UNITS: 1000 INJECTION, SOLUTION INTRAVENOUS; SUBCUTANEOUS at 16:09:00

## 2024-07-08 RX ADMIN — LIDOCAINE HYDROCHLORIDE 50 MG: 10 INJECTION, SOLUTION EPIDURAL; INFILTRATION; INTRACAUDAL; PERINEURAL at 15:20:00

## 2024-07-08 RX ADMIN — PHENYLEPHRINE HCL 100 MCG: 10 MG/ML VIAL (ML) INJECTION at 15:42:00

## 2024-07-08 RX ADMIN — ROCURONIUM BROMIDE 10 MG: 10 INJECTION, SOLUTION INTRAVENOUS at 18:17:00

## 2024-07-08 RX ADMIN — ROCURONIUM BROMIDE 20 MG: 10 INJECTION, SOLUTION INTRAVENOUS at 16:25:00

## 2024-07-08 RX ADMIN — ROCURONIUM BROMIDE 50 MG: 10 INJECTION, SOLUTION INTRAVENOUS at 15:20:00

## 2024-07-08 RX ADMIN — DEXAMETHASONE SODIUM PHOSPHATE 4 MG: 4 MG/ML VIAL (ML) INJECTION at 15:39:00

## 2024-07-08 RX ADMIN — PHENYLEPHRINE HCL 100 MCG: 10 MG/ML VIAL (ML) INJECTION at 15:27:00

## 2024-07-08 RX ADMIN — HEPARIN SODIUM 2000 UNITS: 1000 INJECTION, SOLUTION INTRAVENOUS; SUBCUTANEOUS at 16:22:00

## 2024-07-08 RX ADMIN — ROCURONIUM BROMIDE 10 MG: 10 INJECTION, SOLUTION INTRAVENOUS at 17:10:00

## 2024-07-08 RX ADMIN — PROTAMINE SULFATE 20 MG: 10 INJECTION, SOLUTION INTRAVENOUS at 19:19:00

## 2024-07-08 RX ADMIN — SODIUM CHLORIDE: 9 INJECTION, SOLUTION INTRAVENOUS at 15:15:00

## 2024-07-08 RX ADMIN — PROTAMINE SULFATE 20 MG: 10 INJECTION, SOLUTION INTRAVENOUS at 19:20:00

## 2024-07-08 RX ADMIN — PROTAMINE SULFATE 10 MG: 10 INJECTION, SOLUTION INTRAVENOUS at 19:21:00

## 2024-07-08 RX ADMIN — PHENYLEPHRINE HCL 100 MCG: 10 MG/ML VIAL (ML) INJECTION at 15:31:00

## 2024-07-08 NOTE — ANESTHESIA PROCEDURE NOTES
Airway  Date/Time: 7/8/2024 3:22 PM  Urgency: elective    Airway not difficult    General Information and Staff    Patient location during procedure: OR  Anesthesiologist: Husam Ferraro MD  Performed: anesthesiologist   Performed by: Husam Ferraro MD  Authorized by: Husam Ferraro MD      Indications and Patient Condition  Indications for airway management: anesthesia  Sedation level: deep  Preoxygenated: yes  Patient position: sniffing  Mask difficulty assessment: 1 - vent by mask    Final Airway Details  Final airway type: endotracheal airway      Successful airway: ETT  Cuffed: yes   Successful intubation technique: direct laryngoscopy  Endotracheal tube insertion site: oral  Blade: Sanjay  Blade size: #3  ETT size (mm): 7.5    Cormack-Lehane Classification: grade I - full view of glottis  Placement verified by: capnometry   Cuff volume (mL): 9  Measured from: lips  ETT to lips (cm): 22  Number of attempts at approach: 1

## 2024-07-08 NOTE — ANESTHESIA PROCEDURE NOTES
Peripheral IV  Date/Time: 7/8/2024 3:34 PM  Inserted by: Husam Ferraro MD    Placement  Needle gauge: powerwand.  Laterality: right  Location: antecubital  Local anesthetic: none  Site prep: alcohol  Technique: anatomical landmarks  Attempts: 1

## 2024-07-08 NOTE — ANESTHESIA PROCEDURE NOTES
Peripheral IV  Date/Time: 7/8/2024 3:33 PM  Inserted by: Husam Ferraro MD    Placement  Needle size: 18 G  Laterality: right  Location: hand  Local anesthetic: none  Site prep: alcohol  Technique: anatomical landmarks  Attempts: 3

## 2024-07-08 NOTE — ANESTHESIA PROCEDURE NOTES
Arterial Line    Date/Time: 7/8/2024 3:23 PM    Performed by: Husam Ferraro MD  Authorized by: Husam Ferraro MD    General Information and Staff    Procedure Start:  7/8/2024 3:23 PM  Procedure End:  7/8/2024 3:28 PM  Anesthesiologist:  Husam Ferraro MD  Performed By:  Anesthesiologist  Patient Location:  OR  Indication: continuous blood pressure monitoring and blood sampling needed    Site Identification: real time ultrasound guided, surface landmarks and image stored and retrievable    Preanesthetic Checklist: 2 patient identifiers, IV checked, risks and benefits discussed, monitors and equipment checked, pre-op evaluation, timeout performed, anesthesia consent and sterile technique used    Procedure Details    Catheter Size:  20 G  Catheter Length:  1 and 3/4 inch  Catheter Type:  Arrow  Seldinger Technique?: Yes    Laterality:  Left  Site:  Radial artery  Site Prep: chlorhexidine    Line Secured:  Wrist Brace, tape and Tegaderm    Assessment    Events: patient tolerated procedure well with no complications      Medications  7/8/2024 3:23 PM      Additional Comments

## 2024-07-08 NOTE — ANESTHESIA PREPROCEDURE EVALUATION
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  Temp: 98 °F (36.7 °C)  Pulse: 65  Resp: 14  BP: 144/76  SpO2: 92 %  Body mass index is 26.16 kg/m².    Current medications reviewed.  Hospital Medications:   heparin (Porcine) 5000 UNIT/ML injection 5,000 Units  5,000 Units Subcutaneous Once       Outpatient Medications:     Medications Prior to Admission   Medication Sig Dispense Refill Last Dose    carvedilol 25 MG Oral Tab Take 1 tablet (25 mg total) by mouth 2 (two) times daily with meals.   7/8/2024 at 0800    amLODIPine 2.5 MG Oral Tab Take 1 tablet (2.5 mg total) by mouth daily.   7/8/2024 at 0800    furosemide 20 MG Oral Tab Take 1 tablet (20 mg total) by mouth daily.   7/7/2024    fluticasone-umeclidin-vilant (TRELEGY ELLIPTA) 200-62.5-25 MCG/ACT Inhalation Aerosol Powder, Breath Activated Inhale 1 puff into the lungs daily.   7/7/2024    aspirin 81 MG Oral Tab EC Take 1 tablet (81 mg total) by mouth daily.   7/8/2024 at 0800    ALBUTEROL 108 (90 Base) MCG/ACT Inhalation Aero Soln SHAKE WELL AND INHALE 2 PUFFS EVERY 6 HOURS AS NEEDED FOR WHEEZING (Patient taking differently: Inhale 2 puffs into the lungs every 6 (six) hours as needed.) 34 g 0 7/7/2024    atorvastatin 10 MG Oral Tab Take 1 tablet (10 mg total) by mouth daily. 90 tablet 3 7/8/2024 at 0800       Allergies: Patient has no known allergies.      Anesthesia Evaluation    Patient summary reviewed.    Anesthetic Complications  (-) history of anesthetic complications         GI/Hepatic/Renal                                  Cardiovascular  Comment: AAA sp repair with endoleak                (+) hypertension   (+) hyperlipidemia                                  Endo/Other                                  Pulmonary        (+) COPD   COPD requiring home oxygen.                Neuro/Psych        (+) anxiety                              Past Surgical History:   Procedure Laterality Date    Cataract      Hip replacement surgery Left 08/16/2018    Dr Farrell    Hysterectomy       Social History     Socioeconomic History    Marital status:    Tobacco Use    Smoking status: Former     Current packs/day: 1.00     Average packs/day: 1 pack/day for 40.0 years (40.0 ttl pk-yrs)     Types: Cigarettes     Start date: 8/1/2014    Smokeless tobacco: Never   Vaping Use    Vaping status: Never Used   Substance and Sexual Activity    Alcohol use: Yes     Alcohol/week: 0.0 standard drinks of alcohol     Comment: occasionally    Drug use: No     History   Drug Use No     Available pre-op labs reviewed.  Lab Results   Component Value Date    WBC 7.4 07/01/2024    RBC 4.14 07/01/2024    HGB 12.1 07/01/2024    HCT 38.6 07/01/2024    MCV 93.2 07/01/2024    MCH 29.2 07/01/2024    MCHC 31.3 07/01/2024    RDW 14.7 07/01/2024    .0 07/01/2024     Lab Results   Component Value Date     07/01/2024    K 4.9 07/01/2024     07/01/2024    CO2 30.0 07/01/2024    BUN 21 07/01/2024    CREATSERUM 1.30 (H) 07/01/2024     (H) 07/01/2024    CA 9.4 07/01/2024     Lab Results   Component Value Date    INR 1.06 07/01/2024         Airway      Mallampati: II  Mouth opening: 3 FB  TM distance: 4 - 6 cm  Neck ROM: full Cardiovascular      Rhythm: regular  Rate: normal     Dental  Comment: No loose teeth reported by patient           Pulmonary      Breath sounds clear to auscultation bilaterally.               Other findings              ASA: 3   Plan: general  NPO status verified and         Comment: Discussed general anesthesia with endotracheal  tube/supraglottic airway with patient. Discussed risk of oral/dental trauma, nausea, rare allergic reactions. Patient understands the risks, benefits, and requirement for anesthesia and willing to proceed. Consent signed.    GA with ETT, additional IV access, arterial line discussed.     Plan/risks discussed with: patient                Present on Admission:  **None**

## 2024-07-09 LAB
ANION GAP SERPL CALC-SCNC: 6 MMOL/L (ref 0–18)
APTT PPP: 28.3 SECONDS (ref 23–36)
BASE EXCESS BLD CALC-SCNC: -2 MMOL/L
BASE EXCESS BLD CALC-SCNC: 1 MMOL/L
BUN BLD-MCNC: 23 MG/DL (ref 9–23)
CA-I BLD-SCNC: 1.17 MMOL/L (ref 1.12–1.32)
CA-I BLD-SCNC: 1.17 MMOL/L (ref 1.12–1.32)
CALCIUM BLD-MCNC: 8.2 MG/DL (ref 8.5–10.1)
CHLORIDE SERPL-SCNC: 112 MMOL/L (ref 98–112)
CO2 BLD-SCNC: 24 MMOL/L (ref 22–32)
CO2 BLD-SCNC: 27 MMOL/L (ref 22–32)
CO2 SERPL-SCNC: 23 MMOL/L (ref 21–32)
CREAT BLD-MCNC: 1.41 MG/DL
EGFRCR SERPLBLD CKD-EPI 2021: 36 ML/MIN/1.73M2 (ref 60–?)
ERYTHROCYTE [DISTWIDTH] IN BLOOD BY AUTOMATED COUNT: 14.8 %
GLUCOSE BLD-MCNC: 110 MG/DL (ref 70–99)
GLUCOSE BLD-MCNC: 146 MG/DL (ref 70–99)
GLUCOSE BLD-MCNC: 153 MG/DL (ref 70–99)
HCO3 BLD-SCNC: 22.9 MEQ/L
HCO3 BLD-SCNC: 25.5 MEQ/L
HCT VFR BLD AUTO: 30.4 %
HCT VFR BLD CALC: 30 %
HCT VFR BLD CALC: 31 %
HGB BLD-MCNC: 10.1 G/DL
INR BLD: 1.16 (ref 0.8–1.2)
ISTAT ACTIVATED CLOTTING TIME: 269 SECONDS (ref 74–137)
MCH RBC QN AUTO: 29.6 PG (ref 26–34)
MCHC RBC AUTO-ENTMCNC: 33.2 G/DL (ref 31–37)
MCV RBC AUTO: 89.1 FL
OSMOLALITY SERPL CALC.SUM OF ELEC: 298 MOSM/KG (ref 275–295)
PCO2 BLD: 37.4 MMHG
PCO2 BLD: 37.9 MMHG
PH BLD: 7.39 [PH]
PH BLD: 7.44 [PH]
PLATELET # BLD AUTO: 238 10(3)UL (ref 150–450)
PO2 BLD: 145 MMHG
PO2 BLD: 270 MMHG
POTASSIUM BLD-SCNC: 4.1 MMOL/L (ref 3.6–5.1)
POTASSIUM BLD-SCNC: 4.5 MMOL/L (ref 3.6–5.1)
POTASSIUM SERPL-SCNC: 4.4 MMOL/L (ref 3.5–5.1)
PROTHROMBIN TIME: 14.9 SECONDS (ref 11.6–14.8)
RBC # BLD AUTO: 3.41 X10(6)UL
SAO2 % BLD: 100 %
SAO2 % BLD: 99 %
SODIUM BLD-SCNC: 139 MMOL/L (ref 136–145)
SODIUM BLD-SCNC: 139 MMOL/L (ref 136–145)
SODIUM SERPL-SCNC: 141 MMOL/L (ref 136–145)
WBC # BLD AUTO: 8.1 X10(3) UL (ref 4–11)

## 2024-07-09 PROCEDURE — 80048 BASIC METABOLIC PNL TOTAL CA: CPT | Performed by: SURGERY

## 2024-07-09 PROCEDURE — 85027 COMPLETE CBC AUTOMATED: CPT | Performed by: SURGERY

## 2024-07-09 PROCEDURE — 97530 THERAPEUTIC ACTIVITIES: CPT

## 2024-07-09 PROCEDURE — 97161 PT EVAL LOW COMPLEX 20 MIN: CPT

## 2024-07-09 PROCEDURE — 85730 THROMBOPLASTIN TIME PARTIAL: CPT | Performed by: SURGERY

## 2024-07-09 PROCEDURE — 85610 PROTHROMBIN TIME: CPT | Performed by: SURGERY

## 2024-07-09 NOTE — PLAN OF CARE
Assumed care of patient this morning. Patient a+ox4. Driver. Follows commands. Bilateral groin sites remain c/d/I. No hematoma noted. Ambulating in kinsey with walker. Steady gait noted. Cleared by PT for home. Vascular surgery at bedside.CTU orders. Plan for home tomorrow. Vss. See assessment for complete details. Will continue to monitor.

## 2024-07-09 NOTE — PLAN OF CARE
Assumed care at 1930. A&Ox4, RA, NSR on tele. Bilateral groin sites C/D/I; soft, no hematoma. Prn tylenol and norco given for pain. Call light within reach. Patient updated on plan of care

## 2024-07-09 NOTE — H&P
Vascular Surgery  New Patient Consultation    Name: Monserrat Natarajan  MRN: [id number]  : 1935    Referring Provider: Radha Bro    CC: type Ia endoleak      HPI: 89-year-old female with history of hypertension, hyperlipidemia, tobacco abuse who presents for endoleak of her aortic aneurysm. She underwent repair 1 year ago that was done emergently for contained ruptured aneurysm. She recovered from this. She lives independently but is in a senior living center. She does not smoke. She is on oxygen only with exertion. She at baseline does not wear oxygen. She denies any chest pain or shortness of breath. She denies any abdominal pain or back pain. She is taking aspirin and statin medication. Her blood pressure is well-controlled. She denies any claudication, rest pain or nonhealing wounds. Her previous surgeon is now out of network and she would like to establish care closer. Denies any changes since last seen in clinic.    Past Medical History:   Diagnosis Date   Anxiety state   Cataract   CKD (chronic kidney disease)   Essential hypertension   High cholesterol   Osteoarthritis   Pneumonia due to organism 2017   Pure hypercholesterolemia     Past Surgical History:   Procedure Laterality Date   CATARACT   HC SURGERY CATHETER EPS DX/ABLATION OTHER THAN 3D  2023   AAA repair at [place]   HIP REPLACEMENT SURGERY Left 2018   Dr Farrell   HYSTERECTOMY     No Known Allergies    atorvastatin 10 MG Oral Tab, Take 1 tablet (10 mg total) by mouth daily., Disp: 90 tablet, Rfl: 0  carvedilol 25 MG Oral Tab, Take 1 tablet (25 mg total) by mouth 2 (two) times daily with meals., Disp: 180 tablet, Rfl: 0  amLODIPine 2.5 MG Oral Tab, Take 1 tablet (2.5 mg total) by mouth daily., Disp: 90 tablet, Rfl: 3  furosemide 20 MG Oral Tab, Take 1 tablet (20 mg total) by mouth daily., Disp: 90 tablet, Rfl: 3  Cholecalciferol (VITAMIN D) 50 MCG (2000 UT) Oral Cap, Take 4,000 Units by mouth daily., Disp: , Rfl:   albuterol  108 (90 Base) MCG/ACT Inhalation Aero Soln, Inhale 2 puffs into the lungs every 6 (six) hours as needed for Wheezing., Disp: 1 each, Rfl: 5  Fluticasone-Umeclidin-Vilant (TRELEGY ELLIPTA) 200-62.5-25 MCG/ACT Inhalation Aerosol Powder, Breath Activated, Inhale 1 puff into the lungs daily., Disp: 3 each, Rfl: 2  ipratropium 0.06 % Nasal Solution, 2 sprays by Nasal route 4 (four) times daily., Disp: 1 each, Rfl: 5  aspirin 81 MG Oral Chew Tab, Chew 81 mg by mouth daily., Disp: , Rfl:     No current facility-administered medications on file prior to visit.    Social History  Socioeconomic History  Marital status:   Tobacco Use  Smoking status: Former  Packs/day: 0.00  Years: 1 pack/day for 50.0 years (50.0 ttl pk-yrs)  Types: Cigarettes  Start date: 1960  Quit date: 2010  Years since quittin.4  Smokeless tobacco: Never  Substance and Sexual Activity  Alcohol use: Yes  Alcohol/week: 0.0 standard drinks of alcohol  Comment: occasionally  Drug use: No      Family History   Problem Relation Age of Onset   Heart Disorder Father     ROS:  See HPI above for pertinent positives and negatives.   Constitutional: No fevers, chills, fatigue or night sweats.  ENT: No mouth pain, neck pain, running nose, headaches or swollen glands.  Skin: No rashes, pruritus or skin changes,  Respiratory: Denies cough, wheezing or shortness of breath.  CV: Denies chest pain, palpitations, orthopnea, PND or dizziness.  Musculoskeletal: No joint pain, stiffness or swelling.  GI: No nausea, vomiting or diarrhea. No blood in stools.  Neurologic: No seizures, tremors, weakness or numbness.     Physical Examination  There were no vitals filed for this visit.   Skin no rashes or skin lesions identified  Neck supple; no LAD; trachea midline  RRR  CTAB; breathing comfortably  Abd soft, NT, ND; no palpable abdominal masses.  RUE: Palpable radial and ulnar pulse  LUE: Palpable radial and ulnar pulse  RLE: palp Fem, nonpalp DP, nonpalp PT    LLE: palp Fem, nonpalp DP, nonpalp PT   Neurologic: CN II-XII grossly intact  5/5 sensorimotor function in the bilateral upper and lower extremities.  Feet warm and well-perfused. Biphasic DP bilaterally.    Labs:  Lab Results   Component Value Date   WBC 7.34 02/05/2024   HGB 12.4 02/05/2024   HCT 39.4 02/05/2024    02/05/2024   NEPERCENT 68.9 02/05/2024   LYPERCENT 18.0 02/05/2024   MOPERCENT 7.8 02/05/2024   EOPERCENT 4.2 02/05/2024   BAPERCENT 1.1 02/05/2024   NE 5.06 02/05/2024   LYMABS 1.32 02/05/2024   MOABSO 0.57 02/05/2024   EOABSO 0.31 (H) 02/05/2024   BAABSO 0.08 02/05/2024     Imaging: CTA: Imaging reviewed. See final report for further details. Unable to compare to previous imaging however per outside report there has been slight aneurysm sac progression to over 6.5 cm. There is severe angulation of the neck and there appears to be a type Ia endoleak which opacifies a large portion of the aneurysm sac with delayed imaging. There are no feeding lumbar vessels. This is consistent with a type Ia endoleak.    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 I informed her she was fortunate to survive the contained rupture however her aneurysm has progressed further. Her aneurysm is angulated which is the reason for the further progression and 1A endoleak. I informed her that there is no stent commercially available for her aneurysm. I informed her that she is prohibitive risk for open surgery given her need for intermittent oxygen and her age. I informed her her options would be nonoperative management versus a fenestrated endovascular repair with a physician modified endograft. We discussed the risk and benefits of the procedure extensively not limited to the risk of bleeding, infection, stroke, MI, rupture, death. She affirms her understanding. She strongly desires intervention. Consent obtained. All  questions and concerns were addressed. The patient affirms understanding and agrees with the treatment plan. Proceed to OR as above.    Thank you for allowing me to participate in this patient's care.    David White MD  [facility]  Vascular Surgery

## 2024-07-09 NOTE — BRIEF OP NOTE
Pre-Operative Diagnosis: type 1a endoleak of aortic graft     Post-Operative Diagnosis:  type 1a endoleak of aortic graft     Procedure Performed:   ENDOVASCULAR ABDOMINAL AORTIC ANEURYSM STENT GRAFT REPAIR WITH FENESTRATED DEVICE    Surgeons and Role:     * David White MD      * Daniel Alcocer MD        Surgical Findings: No endoleak with wide patency of viscerals at completion     Specimen: None     Estimated Blood Loss: Blood Output: 400 mL (7/8/2024  7:20 PM)         David White MD  7/8/2024  7:43 PM

## 2024-07-09 NOTE — CONSULTS
General Medicine Consult      Reason for consult: Medical management    Consulted by: Dr. White    PCP: Radha Bro DO      History of Present Illness: Patient is a 89 year old female with PMH sig for anxiety, COPD, hypertension, hyperlipidemia, tobacco abuse who presented for elective repair of aortic aneurysm.  Patient is status post endovascular abdominal aortic aneurysm stent graft repair.  She has a history of ruptured aneurysm approximately 1 year ago and had an emergent repair done at that time.  During my evaluation, patient is sitting in a chair.  Her pain is controlled.  She denies any chest pain shortness of breath dizziness or any other symptoms.  She is tolerating diet.    PMH:  Past Medical History:    Anxiety state    Cataract    COPD (chronic obstructive pulmonary disease) (HCC)    High blood pressure    High cholesterol    Osteoarthritis    Pneumonia due to organism    Pure hypercholesterolemia        PSH:  Past Surgical History:   Procedure Laterality Date    Cataract      Hip replacement surgery Left 08/16/2018    Dr Farrell    Hysterectomy          Home Medications:  Outpatient Medications Marked as Taking for the 7/8/24 encounter (Hospital Encounter)   Medication Sig Dispense Refill    carvedilol 25 MG Oral Tab Take 1 tablet (25 mg total) by mouth 2 (two) times daily with meals.      amLODIPine 2.5 MG Oral Tab Take 1 tablet (2.5 mg total) by mouth daily.      furosemide 20 MG Oral Tab Take 1 tablet (20 mg total) by mouth daily.      fluticasone-umeclidin-vilant (TRELEGY ELLIPTA) 200-62.5-25 MCG/ACT Inhalation Aerosol Powder, Breath Activated Inhale 1 puff into the lungs daily.      aspirin 81 MG Oral Tab EC Take 1 tablet (81 mg total) by mouth daily.      ALBUTEROL 108 (90 Base) MCG/ACT Inhalation Aero Soln SHAKE WELL AND INHALE 2 PUFFS EVERY 6 HOURS AS NEEDED FOR WHEEZING (Patient taking differently: Inhale 2 puffs into the lungs every 6 (six) hours as needed.) 34 g 0    atorvastatin 10 MG  Oral Tab Take 1 tablet (10 mg total) by mouth daily. 90 tablet 3       Scheduled Medication:   atorvastatin  10 mg Oral Daily    carvedilol  25 mg Oral BID with meals    amLODIPine  2.5 mg Oral Daily    furosemide  20 mg Oral Daily    aspirin  81 mg Oral Daily    heparin  5,000 Units Subcutaneous Q8H YULY    clopidogrel  75 mg Oral Daily     Continuous Infusing Medication:   lactated ringers 100 mL/hr at 07/09/24 0606     PRN Medication:  acetaminophen **OR** HYDROcodone-acetaminophen **OR** HYDROcodone-acetaminophen    ondansetron    metoclopramide     ALL:  No Known Allergies     Soc Hx:  Social History     Tobacco Use    Smoking status: Former     Current packs/day: 1.00     Average packs/day: 1 pack/day for 40.0 years (40.0 ttl pk-yrs)     Types: Cigarettes     Start date: 8/1/2014    Smokeless tobacco: Never   Substance Use Topics    Alcohol use: Not Currently     Comment: occasionally        Fam Hx  Family History   Problem Relation Age of Onset    Heart Disorder Father        Review of Systems  Comprehensive ROS reviewed and negative except for what's stated above.  Including negative for chest pain, shortness of breath, syncope.       OBJECTIVE:  /64   Pulse 88   Temp 98.2 °F (36.8 °C) (Temporal)   Resp 15   Ht 5' 2\" (1.575 m)   Wt 143 lb (64.9 kg)   SpO2 97%   BMI 26.16 kg/m²   General:  Alert, no distress, appears stated age.   Head:  Normocephalic, without obvious abnormality, atraumatic.   Eyes:  Sclera anicteric, No conjunctival pallor, EOMs intact.    Nose: Nares normal. Septum midline. Mucosa normal. No drainage.   Throat: Lips, mucosa, and tongue normal. Teeth and gums normal.   Neck: Supple, symmetrical, trachea midline    Lungs:   Clear to auscultation bilaterally. Normal effort   Chest wall:  No tenderness or deformity.   Heart:  Regular rate and rhythm, S1, S2 normal,     Abdomen:   Soft, non-tender. Bowel sounds normal. No masses,  No organomegaly. Non distended   Extremities:  Extremities normal, atraumatic, no cyanosis or edema.   Skin: Skin color, texture, turgor normal. No rashes or lesions.    Neurologic: Normal strength, no focal deficit appreciated       Diagnostics:   CBC/Chem  Recent Labs   Lab 07/09/24  0438   WBC 8.1   HGB 10.1*   MCV 89.1   .0   INR 1.16       Recent Labs   Lab 07/09/24  0438      K 4.4      CO2 23.0   BUN 23   CREATSERUM 1.41*   *   CA 8.2*       No results for input(s): \"ALT\", \"AST\", \"ALB\", \"AMYLASE\", \"LIPASE\", \"LDH\" in the last 168 hours.    Invalid input(s): \"ALPHOS\", \"TBIL\", \"DBIL\", \"TPROT\"    No results for input(s): \"TROP\" in the last 168 hours.      Radiology: No results found.      ASSESSMENT / PLAN:    Patient is a 89 year old female with PMH sig for anxiety, COPD, hypertension, hyperlipidemia, tobacco abuse who presented for elective repair of aortic aneurysm.     Aortic aneurysm  Status post endovascular AAA stent graft repair, POD 1  Postoperative pain  - IV/p.o. pain medications  - Monitor for acute blood loss anemia, follow CBC  - Postop care per vascular surgery  - Continue aspirin    Hypertension  - Continue Coreg with parameters, amlodipine  -Takes Lasix    COPD, not in exacerbation  - Continue Trelegy      Hyperlipidemia  - Continue statin    Suspect CKD, stage III  - Creatinine 1.41 postoperatively, previously noted to be around 1.6-7  - Recommend outpatient nephrology follow-up  - Avoid nephrotoxins      Prophylaxis:   DVT with heparin subcu  Atrophy Prophylaxis ambulate as tolerated  Lines/Monitors:     Dispo: Admit with telemetry  Code status: Full    Patient and/or patient's family given opportunity to ask questions and note understanding and agreeing with therapeutic plan as outlined    Thank you for allowing me to participate in the care of this patient.     Thank You,  DO SEBASTIÁN Blevins Hospitalist  Pager   Answering Service number: [phone]

## 2024-07-09 NOTE — PLAN OF CARE
Problem: Patient/Family Goals  Goal: Patient/Family Long Term Goal  Description: Patient's Long Term Goal: dc home    Interventions:  - work with PT  -participate in plan of care  - See additional Care Plan goals for specific interventions  Outcome: Progressing  Goal: Patient/Family Short Term Goal  Description: Patient's Short Term Goal: pain control    Interventions:   - notify staff when in pain  - use cold/heat therapy  - See additional Care Plan goals for specific interventions  Outcome: Progressing

## 2024-07-09 NOTE — ANESTHESIA POSTPROCEDURE EVALUATION
[facility]    Monserrat Natarajan Patient Status:  Inpatient   Age/Gender 89 year old female MRN [id number]   Location [facility] 6NE-A Attending David White MD   Hosp Day # 0 PCP Radha Bro DO       Anesthesia Post-op Note    ENDOVASCULAR ABDOMINAL AORTIC ANEURYSM STENT GRAFT REPAIR WITH FENESTRATED DEVICE    Procedure Summary       Date: 07/08/24 Room / Location:  CVOR 03 HYBRID /  CVOR    Anesthesia Start: 1515 Anesthesia Stop:     Procedure: ENDOVASCULAR ABDOMINAL AORTIC ANEURYSM STENT GRAFT REPAIR WITH FENESTRATED DEVICE (Groin) Diagnosis: (type 1a endoleak of aortic graft)    Surgeons: David White MD Anesthesiologist: Husam Ferraro MD    Anesthesia Type: general ASA Status: 3            Anesthesia Type: general    Vitals Value Taken Time   /47 07/08/24 1945   Temp 97 07/08/24 1945   Pulse 94 07/08/24 1945   Resp 23 07/08/24 1945   SpO2 97 % 07/08/24 1945   Vitals shown include unfiled device data.    Patient Location: ICU    Anesthesia Type: general    Airway Patency: patent    Postop Pain Control: adequate    Mental Status: mildly sedated but able to meaningfully participate in the post-anesthesia evaluation    Nausea/Vomiting: none    Cardiopulmonary/Hydration status: stable euvolemic    Complications: no apparent anesthesia related complications    Postop vital signs: stable    Dental Exam: Unchanged from Preop    Sign out given to ICU staff.

## 2024-07-09 NOTE — PHYSICAL THERAPY NOTE
PHYSICAL THERAPY EVALUATION - INPATIENT     Room Number: 6612/6612-A  Evaluation Date: 7/9/2024  Type of Evaluation: Initial  Physician Order: PT Eval and Treat    Presenting Problem: S/p 7/8 ENDOVASCULAR ABDOMINAL AORTIC ANEURYSM STENT GRAFT REPAIR WITH FENESTRATED DEVICE  Co-Morbidities : Impaired fasting glucose, HDL  Reason for Therapy: Mobility Dysfunction and Discharge Planning    PHYSICAL THERAPY ASSESSMENT   Patient is currently functioning near baseline with bed mobility, transfers, and gait.  Prior to admission, patient's baseline is Mod I .  At this time pt is observed to be near baseline and will anticipate that pt will reach baseline as pt continues to recover.    Patient will benefit from continued skilled PT Services For duration of hospitalization, however, given the patient is functioning near baseline level do not anticipate skilled therapy needs at discharge .    PLAN              Patient has met all skilled IPPT goals at this time. Patient will be discharged from Physical Therapy services.  Please re-order if a new functional limitation presents during this admission.        PHYSICAL THERAPY MEDICAL/SOCIAL HISTORY  History related to current admission: Patient is a 89 year old female admitted on 7/8/2024 from [facility]  for S/p 7/8 ENDOVASCULAR ABDOMINAL AORTIC ANEURYSM STENT GRAFT REPAIR WITH FENESTRATED DEVICE.    HOME SITUATION  Type of Home: Independent living facility   Home Layout: One level                Lives With: Alone  Drives: Yes  Patient Owned Equipment: Rolling walker  Patient Regularly Uses: Glasses    Prior Level of [place]: Pt states that she lives at an [facility]. Pt reports that she is able to do all her ADLs IND. Uses RW in the hallways and no assisted device in her room. Pt has no history of falls.     SUBJECTIVE  \"I feel better\"      OBJECTIVE  Precautions: None  Fall Risk: Standard fall risk    WEIGHT BEARING RESTRICTION  Weight Bearing Restriction: None                PAIN  ASSESSMENT  Rating: Unable to rate  Location: site of the incision       COGNITION  Overall Cognitive Status:  WFL - within functional limits    RANGE OF MOTION AND STRENGTH ASSESSMENT  Upper extremity ROM and strength are within functional limits     Lower extremity ROM is within functional limits     Lower extremity strength is within functional limits       BALANCE  Static Sitting: Fair +  Dynamic Sitting: Fair +  Static Standing: Fair +  Dynamic Standing: Fair +    ADDITIONAL TESTS                                    ACTIVITY TOLERANCE                         O2 WALK       NEUROLOGICAL FINDINGS                        AM-PAC '6-Clicks' INPATIENT SHORT FORM - BASIC MOBILITY  How much difficulty does the patient currently have...  Patient Difficulty: Turning over in bed (including adjusting bedclothes, sheets and blankets)?: None   Patient Difficulty: Sitting down on and standing up from a chair with arms (e.g., wheelchair, bedside commode, etc.): None   Patient Difficulty: Moving from lying on back to sitting on the side of the bed?: None   How much help from another person does the patient currently need...   Help from Another: Moving to and from a bed to a chair (including a wheelchair)?: None   Help from Another: Need to walk in hospital room?: None   Help from Another: Climbing 3-5 steps with a railing?: A Little       AM-PAC Score:  Raw Score: 23   Approx Degree of Impairment: 11.2%   Standardized Score (AM-PAC Scale): 56.93   CMS Modifier (G-Code): CI    FUNCTIONAL ABILITY STATUS  Gait Assessment   Functional Mobility/Gait Assessment  Gait Assistance: Supervision  Distance (ft): 200ft  Assistive Device: Rolling walker  Pattern: Within Functional Limits    Skilled Therapy Provided     Bed Mobility:  Rolling: NT  Supine to sit: NT   Sit to supine: NT     Transfer Mobility:  Sit to stand: Mod I   Stand to sit: Mod I  Gait = Sup 200ft using RW    Therapist's Comments: Pt was observed with no LOB when ambulating  when using the RW.     Exercise/Education Provided:  Bed mobility  Body mechanics  Gait training  Transfer training    Patient End of Session: Up in chair;Needs met;RN aware of session/findings;Call light within reach;All patient questions and concerns addressed;Alarm set      Patient Evaluation Complexity Level:  History Moderate - 1 or 2 personal factors and/or co-morbidities   Examination of body systems Low - addressing 1-2 elements   Clinical Presentation Low - Stable   Clinical Decision Making Low - Stable       PT Session Time: 23 minutes  Therapeutic Activity: 15 minutes

## 2024-07-10 VITALS
DIASTOLIC BLOOD PRESSURE: 55 MMHG | OXYGEN SATURATION: 97 % | WEIGHT: 143 LBS | HEIGHT: 62 IN | SYSTOLIC BLOOD PRESSURE: 116 MMHG | HEART RATE: 78 BPM | BODY MASS INDEX: 26.31 KG/M2 | RESPIRATION RATE: 21 BRPM | TEMPERATURE: 98 F

## 2024-07-10 LAB
ISTAT ACTIVATED CLOTTING TIME: 134 SECONDS (ref 74–137)
ISTAT ACTIVATED CLOTTING TIME: 244 SECONDS (ref 74–137)
ISTAT ACTIVATED CLOTTING TIME: 256 SECONDS (ref 74–137)
ISTAT ACTIVATED CLOTTING TIME: 257 SECONDS (ref 74–137)
ISTAT ACTIVATED CLOTTING TIME: 269 SECONDS (ref 74–137)

## 2024-07-10 RX ORDER — CLOPIDOGREL BISULFATE 75 MG/1
75 TABLET ORAL DAILY
Qty: 90 TABLET | Refills: 0 | Status: SHIPPED | OUTPATIENT
Start: 2024-07-10 | End: 2024-10-08

## 2024-07-10 NOTE — CM/SW NOTE
Briefly met with patient to discuss cost of plavix.  Patient shared she normally uses [facility] to fill her prescriptions--[facility]--had script transferred to the patient's [facility]--provided patient with good rx coupon for $17.95 per month--need daughter's email address to provide patient with $7.95 special coupon    Daughter provided email address--able to activate good rx coupon for $7.95 (on time only)--called patient's [facility]--updated of use--faxed to

## 2024-07-10 NOTE — PLAN OF CARE
Problem: Patient/Family Goals  Goal: Patient/Family Long Term Goal  Description: Patient's Long Term Goal: dc home    Interventions:  - work with PT  -participate in plan of care  - See additional Care Plan goals for specific interventions  Outcome: Completed  Goal: Patient/Family Short Term Goal  Description: Patient's Short Term Goal: pain control    Interventions:   - notify staff when in pain  - use cold/heat therapy  - See additional Care Plan goals for specific interventions  Outcome: Completed

## 2024-07-10 NOTE — PROGRESS NOTES
DM Hospitalist Progress Note     CC: Hospital Follow up    PCP: Radha Bro DO       Assessment/Plan:     Active Problems:    Type Ia endoleak of aortic graft (HCC)          Patient is a 89 year old female with PMH sig for anxiety, COPD, hypertension, hyperlipidemia, tobacco abuse who presented for elective repair of aortic aneurysm.      Aortic aneurysm  Status post endovascular AAA stent graft repair, POD 2  Postoperative pain  - IV/p.o. pain medications  - Monitor for acute blood loss anemia, Hg stable.  - Postop care per vascular surgery  - Continue aspirin     Hypertension  - Continue Coreg with parameters, amlodipine  -Takes Lasix     COPD, not in exacerbation  - Continue Trelegy        Hyperlipidemia  - Continue statin     Suspect CKD, stage III  - Creatinine 1.41 postoperatively, previously noted to be around 1.6-7  - Recommend outpatient nephrology follow-up  - Avoid nephrotoxins        Prophylaxis:   DVT with heparin subcu  Atrophy Prophylaxis ambulate as tolerated  Lines/Monitors:      Dispo: Admit with telemetry  Code status: Full     Patient and/or patient's family given opportunity to ask questions and note understanding and agreeing with therapeutic plan as outlined     Thank you for allowing me to participate in the care of this patient.      Thank You,  Nava Longo DO     [facility] Hospitalist  Pager   Answering Service number: [phone]     Subjective:     No CP, SOB, or N/V. Doing well. Stable for dc     OBJECTIVE:    Blood pressure 116/55, pulse 78, temperature 97.6 °F (36.4 °C), temperature source Temporal, resp. rate 21, height 5' 2\" (1.575 m), weight 143 lb (64.9 kg), SpO2 97%.    Temp:  [97.6 °F (36.4 °C)-98.9 °F (37.2 °C)] 97.6 °F (36.4 °C)  Pulse:  [66-83] 78  Resp:  [14-27] 21  BP: (103-124)/(48-62) 116/55  SpO2:  [96 %-99 %] 97 %      Intake/Output:    Intake/Output Summary (Last 24 hours) at 7/10/2024 1318  Last data filed at 7/10/2024 0800  Gross per 24 hour   Intake 240 ml   Output  --   Net 240 ml       Last 3 Weights   07/08/24 2006 143 lb (64.9 kg)   07/08/24 1152 143 lb (64.9 kg)   06/28/24 1500 140 lb (63.5 kg)   09/03/21 1059 143 lb (64.9 kg)   07/20/21 1129 142 lb (64.4 kg)       Exam   Gen: No acute distress, alert and oriented x3, no focal neurologic deficits  Heent: NC AT, mucous memb clear, neck supple  Pulm: Lungs clear, normal respiratory effort  CV: Heart with regular rate and rhythm, no peripheral edema  Abd: Abdomen soft, nontender, nondistended, bowel sounds present  MSK: Full range of motion in extremities, no clubbing, no cyanosis  Skin: no rashes or lesions  Neuro: AO*3, motor intact, no sensory deficits  Psyc: appropriate mood and affect      Data Review:       Labs:     Recent Labs   Lab 07/09/24  0438   RBC 3.41*   HGB 10.1*   HCT 30.4*   MCV 89.1   MCH 29.6   MCHC 33.2   RDW 14.8   WBC 8.1   .0         Recent Labs   Lab 07/09/24  0438   *   BUN 23   CREATSERUM 1.41*   EGFRCR 36*   CA 8.2*      K 4.4      CO2 23.0       No results for input(s): \"ALT\", \"AST\", \"ALB\", \"AMYLASE\", \"LIPASE\", \"LDH\" in the last 168 hours.    Invalid input(s): \"ALPHOS\", \"TBIL\", \"DBIL\", \"TPROT\"      Imaging:  No results found.      Meds:      fluticasone-umeclidin-vilant  1 puff Inhalation Daily    atorvastatin  10 mg Oral Daily    carvedilol  25 mg Oral BID with meals    amLODIPine  2.5 mg Oral Daily    furosemide  20 mg Oral Daily    aspirin  81 mg Oral Daily    heparin  5,000 Units Subcutaneous Q8H YULY    clopidogrel  75 mg Oral Daily         acetaminophen **OR** HYDROcodone-acetaminophen **OR** HYDROcodone-acetaminophen    ondansetron    metoclopramide

## 2024-07-10 NOTE — PLAN OF CARE
Assumed care of patient this morning. Patient a+ox4. Driver. Follows commands. Ambulating in halls. Steady gait noted. Bilateral groins remain intact. Discharge orders received. AVS printed/reviewed with patient. Questions encouraged/answered. Vss. See assessment for complete details. [facility]

## 2024-07-10 NOTE — DISCHARGE INSTRUCTIONS
Continue aspirin   Start clopidogrel 75 mg daily   Walk 20-30 minutes daily   No heavy lifting - nothing over 10 pounds  Take deep breaths and cough every hour   Eat regular diet with vegetables and stay hydrated with water to avoid constipation   Can shower - remove dressings

## 2024-07-10 NOTE — DISCHARGE SUMMARY
[place]  Discharge Summary    Monserrat Natarajan Patient Status:  Inpatient    1935 MRN [id number]   Location [facility] 6NE-A Attending No att. 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  She underwent fenestrated endovascular aortic repair.  Her postop course was unremarkable.  On postoperative day 2 she was tolerating p.o., ambulating without any issues and voiding without any issues.  She was discharged home with her baseline home oxygen.    Consultations: Hospitalist    Procedures: FEVAR    Complications: None    Disposition: Home or Self Care    Discharge Condition: Good    Discharge Medications:   Discharge Medication List as of 7/10/2024 10:31 AM        START taking these medications    Details   clopidogrel 75 MG Oral Tab Take 1 tablet (75 mg total) by mouth daily., Normal, Disp-90 tablet, R-0Meds to beds           CONTINUE these medications which have NOT CHANGED    Details   carvedilol 25 MG Oral Tab Take 1 tablet (25 mg total) by mouth 2 (two) times daily with meals., Historical      amLODIPine 2.5 MG Oral Tab Take 1 tablet (2.5 mg total) by mouth daily., Historical      furosemide 20 MG Oral Tab Take 1 tablet (20 mg total) by mouth daily., Historical      fluticasone-umeclidin-vilant (TRELEGY ELLIPTA) 200-62.5-25 MCG/ACT Inhalation Aerosol Powder, Breath Activated Inhale 1 puff into the lungs daily., Historical      aspirin 81 MG Oral Tab EC Take 1 tablet (81 mg total) by mouth daily.,  Historical      ALBUTEROL 108 (90 Base) MCG/ACT Inhalation Aero Soln SHAKE WELL AND INHALE 2 PUFFS EVERY 6 HOURS AS NEEDED FOR WHEEZING, Normal, Disp-34 g, R-0      atorvastatin 10 MG Oral Tab Take 1 tablet (10 mg total) by mouth daily., Normal, Disp-90 tablet, R-3             Follow up Visits: Follow-up with Dr White in 2-3 weeks.      David White MD  7/10/2024  4:30 PM

## 2024-07-11 LAB
BLOOD TYPE BARCODE: 9500
UNIT VOLUME: 350 ML

## 2024-07-11 NOTE — PAYOR COMM NOTE
--------------  DISCHARGE REVIEW    Payor: [employer]  Subscriber #:  [id number]  Authorization Number: [id number]    Admit date: 24  Admit time:  10:53 AM  Discharge Date: 7/10/2024 12:22 PM     Admitting Physician: David White MD  Attending Physician:  No att. providers found  Primary Care Physician: Radha Bro DO          Discharge Summary Notes        Discharge Summary signed by David White MD at 7/10/2024  4:31 PM       Author: David White MD Specialty: SURGERY, VASCULAR Author Type: Physician    Filed: 7/10/2024  4:31 PM Date of Service: 7/10/2024  4:30 PM Status: Signed    : David White MD (Physician)         [facility]  Discharge Summary    Monserrat Natarajan Patient Status:  Inpatient    1935 MRN [id number]   Location [address] Attending No att. 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  She underwent fenestrated endovascular aortic repair.  Her postop course was unremarkable.  On postoperative day 2 she was tolerating p.o., ambulating without any issues and voiding without any issues.  She was discharged home with her baseline home oxygen.    Consultations: Hospitalist    Procedures: FEVAR    Complications: None    Disposition: Home or Self Care    Discharge Condition: Good    Discharge Medications:   Discharge Medication List as of 7/10/2024 10:31 AM        START taking these medications    Details   clopidogrel 75 MG Oral Tab  Take 1 tablet (75 mg total) by mouth daily., Normal, Disp-90 tablet, R-0Meds to beds           CONTINUE these medications which have NOT CHANGED    Details   carvedilol 25 MG Oral Tab Take 1 tablet (25 mg total) by mouth 2 (two) times daily with meals., Historical      amLODIPine 2.5 MG Oral Tab Take 1 tablet (2.5 mg total) by mouth daily., Historical      furosemide 20 MG Oral Tab Take 1 tablet (20 mg total) by mouth daily., Historical      fluticasone-umeclidin-vilant (TRELEGY ELLIPTA) 200-62.5-25 MCG/ACT Inhalation Aerosol Powder, Breath Activated Inhale 1 puff into the lungs daily., Historical      aspirin 81 MG Oral Tab EC Take 1 tablet (81 mg total) by mouth daily., Historical      ALBUTEROL 108 (90 Base) MCG/ACT Inhalation Aero Soln SHAKE WELL AND INHALE 2 PUFFS EVERY 6 HOURS AS NEEDED FOR WHEEZING, Normal, Disp-34 g, R-0      atorvastatin 10 MG Oral Tab Take 1 tablet (10 mg total) by mouth daily., Normal, Disp-90 tablet, R-3             Follow up Visits: Follow-up with Dr White in 2-3 weeks.      David White MD  7/10/2024  4:30 PM      Electronically signed by David White MD on 7/10/2024  4:31 PM         REVIEWER COMMENTS

## 2024-07-19 NOTE — OPERATIVE REPORT
Vascular Surgery Procedure Note  Monserrat Natarajan  [id number]  2/1/1935         Date: 06/28/2024        Procedure: Physician Planning of a patient specific fenestrated visceral aortic graft requiring a minimum of 90 minutes of physician time. CPT 71617     89 year old femoral with numerous comorbidities type Ia endoleak. She underwent preoperative planning that was extensive and revealed that a conventional Zenith fenestrated custom-made graft would not be an option. As such, a physician modified fenestrated endovascular aortic repair was recommended.     After review of CT scan, detailed three-dimensional reconstruction was performed with TMAT it was determined that the patient would not have adequate seal zone without extending to the celiac artery.  Therefore measurements were performed to create fenestrations for the celiac, SMA, and bilateral renal arteries.  The measurements were recorded with the plan to perform on 07/08/2024. Over 90 minutes was used in measuring and creating the endograft for this patient. See operative dictation for procedure details.      David White MD  [facility]  Vascular Surgery

## 2024-07-19 NOTE — OPERATIVE REPORT
[facility]     PATIENT'S NAME:   Monserrat Natarajan    ATTENDING PHYSICIAN: David White MD   OPERATING PHYSICIAN: David White MD      MEDICAL RECORD #: [id number]    YOB: 1935       OPERATION DATE: 07/08/2024     OPERATIVE REPORT     PREOPERATIVE DIAGNOSIS:    1.    Type Ia endoleak  2.  Hypertension  3.  COPD     POSTOPERATIVE DIAGNOSIS:    1.    Type Ia endoleak  2.  Hypertension  3.  COPD      PROCEDURE:    1.       Ultrasound-guided access of the bilateral common femoral artery.     2.       Aortogram with radiologic supervision and interpretation.     3.       Intravascular ultrasound of the left common iliac artery, left external iliac artery, right common iliac artery, right external iliac artery, and  aorta with radiologic supervision and interpretation.                      4.  Selective catheterization to first order branches of the aorta ( celiac, SMA, right renal, left renal) with associated angiogram (with radiologic supervision and interpretation).                                       5. Thoracic endovascular aortic repair without coverage of left subclavian artery with Cook Alpha 28x109 stent graft.     6. Fenestrated endovascular aortic repair with placement with 4 fenestrations ( CeliacSMA, left renal, right renal) CPT 48906  Celiac [id number]  SMA 6x29 VBX    Left Renal 5x29 VBX   Right Renal 5x29 VBX                                                             2.Closure of  Bilateral groin with proglide perclose suture (16 Fr Left,  16Fr Right).                      CO-SURGEONS: David Sterling MD and  Daniel Alcocer M.D.  Due to the complexity of the case, I asked my partner to act as a co-surgeon for this operation.      ANESTHESIA:  General.      ESTIMATED BLOOD LOSS:  200 mL.           Complications: None     Drains: None     Findings: No endoleak with preservation of flow in all visceral vessels. Moving all extremities at completion. Doppler DP bilaterally.      Disposition: Extubated and taken to the ICU in stable condition.      Indications for procedure: This is a 89 year-old female who had a previous infrarenal aortic repair.  During surveillance she was found to have proximal degeneration with 1A endoleak with subsequent growth of the aneurysm sac with degeneration in the neck. Given her age and comorbidities she is not a candidate for open intervention.  I informed her that there is not a device that is commercially available in the [place] that would be suitable with sealing this and thus a physician modified repair was recommended.  We discussed the risks and benefits of the procedure not limited to risk of bleeding, infection, stroke, myocardial infarction, renal insufficiency, spinal cord ischemia, death. She affirmed his understanding and strongly desired to proceed.       On the morning of 07/08, the patient was brought into the operating room and placed in supine position.  General anesthesia was induced without any issues.  A Ohara catheter  and arterial line were placed in sterile fashion without any issues. Ancef was given for surgical antimicrobial prophylaxis.  The patient was subsequently prepped and draped in the usual sterile fashion.  Timeout was performed.     Prior to initiation, I performed the back table fenestrated graft creation. Based on preoperative measurements on centerline, 4 fenestrations were placed on the graft with ophthalmic cautery.  A micropuncture wire was used to suture and reinforce each fenestration with 4-0 Ethibond suture.  Diameter reducing ties were placed circumferrentially around each z stent with 4-0 chromic suture, which was used to reduce the diameter by greater than 50%.  The graft was then re-constrained with an umbilical tape and resheathed.  The graft was then ready for use.       With the use of ultrasound, the femoral bifucation was identified over the femoral head. I utilized a micropuncture to access the  right common femoral artery  with placement of a micro wire and exchanged for a J wire and subsequent 6 Irish sheath. This was then predilated with 8 Irish sheath and then two pro-glide Perclose sutures were placed in the 10 and 2 o'clock position in standard fashion with placement of 8 Irish sheath. Dr. Alcocer performed this on the left.     At this point we opted to proceed with the fenestrated procedure.     In the left groin I exchanged for a Lunderquist wire and then performed intravascular ultrasound of right common iliac, right external iliac, and aorta that was used to determine and verify the locations of the celiac, celiac,  SMA and bilateral renal arteries. I confirmed under fluoroscopy proper orientation of the graft.  In the right groin we then predilated the tract  and advanced the device with 16 Irish sheath up to 5-6cm above the celiac artery in the thoracic aorta at T11. I then unsheathed it in to the visceral aorta just above the flow divider. Dr. Alcocer then selected the graft. I confirmed this with intravascular ultrasound from the left external iliac artery, left common iliac artery and into the aorta with confirmation of entry into the graft. He then advanced an 16 Fr sheath up the left groin into the device.  This was confirmed with intravascular ultrasound up the left external iliac artery, left common iliac artery and into the aortic graft. While the graft was constrained, I exchanged for a vanshee 4 catheter and with a glidewire, I was able to select the SMA, confirmed cannulation via contrast angiography with placement of a Aquino wire. He then utilized the catheter with glidewire to select the right renal artery followed by placement of a aquino wire.      I then cannulated the left renal with vanshee 4 catheter and placed a aquino wire. I then used an aptus  sheath to select the celiac artery and placed a aquino wire in this.     I then placed 6 Fr zen sheaths in the  bilateral renal arteries with placement of 5x29 VBX on the right and 5x29 VBX on the left.      I then deployed the graft and removed the delivery system with placement of 20 Fr sheath. Dr. Alcocer then balloon the proximal edge with MOAB ballooon and left this in place to serve as a backboard in case as needed.     Dr Alcocer  then withdrew the sheath partially in the left renal and then I deployed the left renal stent without issue. This was then post dilated with a [id number] balloon. Contrast injection revealed wide patency without any leak. The sheath and wire were removed.       I the withdrew the sheath in the right renal and then I deployed the stent without any issues.  This was flared with a 10 x 2 balloon.  A repeat angiogram revealed wide patency with no leak.  The wire and sheath were removed.  I then placed a 6 French ansell in the SMA followed by a 6x29 VBX in place. Dr. Alcocer withdrew the sheath from the SMA and I deployed this without any issues and then flared the proximal segment with a 10 mm balloon.   Repeat contrast angiography revealed no further residual stenosis and wide patency and complete seal of the segment. The wire and catheter were removed. A tourguide was then put in place in the celiac and with backboard in place a 7x29 VBX was delivered by me. Dr. Alcocer deployed the stent without issue and I flaired this with 10mm balloon. Repeat angiogram revealed wide patency with no leak. The wire and sheath were removed.        I then exchanged for a pigtail catheter up the right groin.  An aortogram and pelvic angiogram revealed wide patency of the visceral vessels and with no endoleak with wide patency into the iliac limbs. At this point we were satisfied and opted to terminate the procedure. All catheters were removed.  The pro-glide Perclose sutures were then cinched and locked in the groins with out issues and cut thereby closing the arteriotomy sites. Manual pressure was held.     Protamine  was administered for reversal of heparinization.  Upon completion there was evidence of excellent hemostasis and a sterile dressing was applied. Doppler signal was confirmed in the bilateral lower extremities consistent with preoperative baseline.  The patient awoke from general anesthesia was extubated, moving all extermities, and taken to the ICU in stable condition. All counts were correct at the end of the case.       David White MD

## 2024-08-16 ENCOUNTER — ORDER TRANSCRIPTION (OUTPATIENT)
Dept: ADMINISTRATIVE | Facility: HOSPITAL | Age: 89
End: 2024-08-16

## 2024-08-16 DIAGNOSIS — Z98.890 STATUS POST ENDOVASCULAR ANEURYSM REPAIR (EVAR): Primary | ICD-10-CM

## 2024-08-16 DIAGNOSIS — Z86.79 STATUS POST ENDOVASCULAR ANEURYSM REPAIR (EVAR): Primary | ICD-10-CM

## 2024-08-30 ENCOUNTER — HOSPITAL ENCOUNTER (OUTPATIENT)
Dept: CT IMAGING | Facility: HOSPITAL | Age: 89
Discharge: HOME | End: 2024-08-30
Payer: MEDICARE

## 2024-08-30 DIAGNOSIS — Z98.890 STATUS POST ENDOVASCULAR ANEURYSM REPAIR (EVAR): ICD-10-CM

## 2024-08-30 DIAGNOSIS — Z86.79 STATUS POST ENDOVASCULAR ANEURYSM REPAIR (EVAR): ICD-10-CM

## 2024-08-30 LAB
CREAT BLD-MCNC: 1.7 MG/DL
EGFRCR SERPLBLD CKD-EPI 2021: 29 ML/MIN/1.73M2 (ref 60–?)

## 2024-08-30 PROCEDURE — 82565 ASSAY OF CREATININE: CPT

## 2024-08-30 PROCEDURE — 74174 CTA ABD&PLVS W/CONTRAST: CPT

## 2024-08-30 RX ORDER — SODIUM CHLORIDE 9 MG/ML
INJECTION, SOLUTION INTRAVENOUS SEE ADMIN INSTRUCTIONS
Status: DISCONTINUED | OUTPATIENT
Start: 2024-08-30 | End: 2024-09-01

## 2024-08-30 RX ADMIN — SODIUM CHLORIDE: 9 INJECTION, SOLUTION INTRAVENOUS at 14:56:00

## 2024-08-30 RX ADMIN — SODIUM CHLORIDE: 9 INJECTION, SOLUTION INTRAVENOUS at 13:33:00

## 2024-08-30 NOTE — IMAGING NOTE
1333   PT HERE FOR HYDRATION PRE CTA AAA POST STENT PROTOCOL, ALLERGIES REVIEWED  CONFIRMED. IV STARTED BY CT .     500 ML NACL STARTED TO INFUSE OVER 1 HOUR PRE CT , THEN 500ML OVER 1 HOUR AFTER SCAN

## 2024-08-30 NOTE — IMAGING NOTE
I brought pt in wheelchair from [facility] CT to [facility] for IV hydration prior to & after CT. Pt's dghtr present. CT tech at [facility] had explained to them need to move to hospital CT due to need for RN to administer NS. CT tech started PIV & marcello POC creat/GFR. IVF started, paused for CT scan, resumed after scan. Pt discharged by DEMOND Marina RN, at 1605.

## (undated) DEVICE — ADVANCE, 35LP LOW PROFILE PTA BALLOON DILATATION CATHETER: Brand: ADVANCE

## (undated) DEVICE — BEACON TIP TORCON NB ADVANTAGE CATHETER: Brand: BEACON TIP TORCON NB

## (undated) DEVICE — 1010 S-DRAPE TOWEL DRAPE 10/BX: Brand: STERI-DRAPE™

## (undated) DEVICE — PROXIMATE RH ROTATING HEAD SKIN STAPLERS (35 WIDE) CONTAINS 35 STAINLESS STEEL STAPLES: Brand: PROXIMATE

## (undated) DEVICE — CATHETER ANGIO 5FR L65CM GWIRE 0.038IN KMP

## (undated) DEVICE — PACK CDS TOTAL HIP

## (undated) DEVICE — STERILE LATEX POWDER-FREE SURGICAL GLOVESWITH NITRILE COATING: Brand: PROTEXIS

## (undated) DEVICE — BLADE ELECTRODE: Brand: EDGE

## (undated) DEVICE — KIT INFL DEV 20ML W/ INSRT TOOL 3 W STPCOCK

## (undated) DEVICE — STEERABLE GUIDEWIRE: Brand: BACK-UP MEIER

## (undated) DEVICE — PINNACLE INTRODUCER SHEATH: Brand: PINNACLE

## (undated) DEVICE — DRAPE SRG 70X60IN SPLT U IMPRV

## (undated) DEVICE — Device: Brand: QUICK-CROSS SUPPORT CATHETER

## (undated) DEVICE — BLADE SAW SAGITTAL 19.5

## (undated) DEVICE — COVER,TABLE,44X90,STERILE: Brand: MEDLINE

## (undated) DEVICE — GOWN,SIRUS,FABRIC-REINFORCED,X-LARGE: Brand: MEDLINE

## (undated) DEVICE — STERILE POLYISOPRENE POWDER-FREE SURGICAL GLOVES: Brand: PROTEXIS

## (undated) DEVICE — Device

## (undated) DEVICE — ADHESIVE SKIN TOP FOR WND CLSR DERMBND ADV

## (undated) DEVICE — GUIDEWIRE: Brand: NITINOL GUIDEWIRE

## (undated) DEVICE — CV PACK-LF: Brand: MEDLINE INDUSTRIES, INC.

## (undated) DEVICE — SOL  .9 1000ML BTL

## (undated) DEVICE — CATHETER PTCA BLLN L4CM INFL 10-37MM CATH

## (undated) DEVICE — 3M™ IOBAN™ 2 ANTIMICROBIAL INCISE DRAPE 6651EZ: Brand: IOBAN™ 2

## (undated) DEVICE — SOLUTION IRRIG 1000ML 0.9% NACL USP BTL

## (undated) DEVICE — KIT,RULER,6 IN,DISPOSABLE,STR: Brand: MEDLINE

## (undated) DEVICE — RADIFOCUS GLIDEWIRE ADVANTAGE GUIDEWIRE: Brand: GLIDEWIRE ADVANTAGE

## (undated) DEVICE — GAMMEX® NON-LATEX SIZE 7.5, STERILE NEOPRENE POWDER-FREE SURGICAL GLOVE: Brand: GAMMEX

## (undated) DEVICE — BATTERY

## (undated) DEVICE — FIXED CORE WIRE GUIDE SAFE-T-J, CURVED: Brand: COOK

## (undated) DEVICE — CATHETER RIM

## (undated) DEVICE — GUIDEWIRE: Brand: AMPLATZ SUPER STIFF™

## (undated) DEVICE — DRESSING BORDER AQUACEL 4X10

## (undated) DEVICE — SUTURE ETHIBOND 5-0 MB46G

## (undated) DEVICE — 3M™ STERI-DRAPE™ INSTRUMENT POUCH 1018: Brand: STERI-DRAPE™

## (undated) DEVICE — SPLINT ORTH UNV HIP PD CUP LG

## (undated) DEVICE — NEEDLE SPINAL 20X3-1/2 YELLOW

## (undated) DEVICE — ROSEN CURVED WIRE GUIDE: Brand: ROSEN

## (undated) DEVICE — GLIDEX™ COATED HYDROPHILIC GUIDEWIRE: Brand: MAGIC TORQUE™

## (undated) DEVICE — SUTURE VICRYL 2-0 CP-1

## (undated) DEVICE — Device: Brand: VISIONS PV .035 DIGITAL IVUS CATHETER

## (undated) DEVICE — TRAY FOLEY BDX 16F STATLOCK

## (undated) DEVICE — CHLORAPREP 26ML APPLICATOR

## (undated) DEVICE — RADIFOCUS GLIDEWIRE: Brand: GLIDEWIRE

## (undated) DEVICE — FLEXOR, CHECK-FLO, INTRODUCER ANSEL MODIFICATION - WITH HIGH-FLEX DILATOR AND HYDROPHILIC COATING: Brand: FLEXOR

## (undated) DEVICE — SUTURE ETHIBOND 1 CT-1

## (undated) DEVICE — CONTAINER SPEC STR 4OZ GRY LID

## (undated) DEVICE — DRAPE SHEET LG

## (undated) DEVICE — HEWSON SUTURE RETRIEVER: Brand: HEWSON SUTURE RETRIEVER

## (undated) DEVICE — 3M™ BAIR HUGGER® UNDERBODY BLANKET, FULL ACCESS, 10 PER CASE 63500: Brand: BAIR HUGGER™

## (undated) DEVICE — FLEXOR, CHECK-FLO, INTRODUCER ANSEL 1 MODIFICATION - WITH HIGH-FLEX DILATOR AND HYDROPHILIC COATING: Brand: FLEXOR

## (undated) DEVICE — GLIDEX™ COATED HYDROPHILIC GUIDEWIRE: Brand: MAGIC TORQUE™ DLVR

## (undated) DEVICE — SUT CHRM GUT 3-0 27IN SH ABSRB UD 26MM 1/2

## (undated) DEVICE — 3M™ TEGADERM™ TRANSPARENT FILM DRESSING FRAME STYLE, 1626W, 4 IN X 4-3/4 IN (10 CM X 12 CM), 50/CT 4CT/CASE: Brand: 3M™ TEGADERM™

## (undated) DEVICE — SOL  .9 3000ML

## (undated) DEVICE — SUTURE VICRYL 0 CP-1

## (undated) DEVICE — SUTURE VLOC 90 3-0 9\" 2044

## (undated) DEVICE — ANGIOGRAPHIC CATHETER: Brand: EXPO™

## (undated) DEVICE — T5 HOOD WITH PEEL AWAY FACE SHIELD

## (undated) DEVICE — REM POLYHESIVE ADULT PATIENT RETURN ELECTRODE: Brand: VALLEYLAB

## (undated) DEVICE — PTA BALLOON DILATATION CATHETER: Brand: MUSTANG™

## (undated) DEVICE — TRAY CATH 16FR F INCL BARDX IC COMPLT CARE

## (undated) DEVICE — SUT ETHBND XL 4-0 30IN RB-1 NABSRB GRN 17MM 1

## (undated) DEVICE — ANGIO PACK-LF: Brand: MEDLINE INDUSTRIES, INC.

## (undated) DEVICE — 3M™ COBAN™ NL STERILE NON-LATEX SELF-ADHERENT WRAP, 2084S, 4 IN X 5 YD (10 CM X 4,5 M), 18 ROLLS/CASE: Brand: 3M™ COBAN™

## (undated) DEVICE — PERCLOSE PROGLIDE™ SUTURE-MEDIATED CLOSURE SYSTEM: Brand: PERCLOSE PROGLIDE™

## (undated) DEVICE — CHLORAPREP ORANGE TINT 10.5ML

## (undated) DEVICE — 60 ML SYRINGE LUER-LOCK TIP: Brand: MONOJECT

## (undated) DEVICE — HIGH TEMPERATURE CAUTERY: Brand: ACCU-TEMP®

## (undated) DEVICE — SLEEVE COMPR MD KNEE LEN SGL USE KENDALL SCD

## (undated) NOTE — IP AVS SNAPSHOT
[facility]            (For Outpatient Use Only) Initial Admit Date: 8/16/2018   Inpt/Obs Admit Date: Inpt: 8/16/18 / Obs: N/A   Discharge Date:    Kathryn Barrientos:  [de-identified]   MRN: [de-identified]   CSN: [id number]        ENCOUNTER  Patient Class Subscriber ID:  Pt Rel to Subscriber:    Hospital Account Financial Class: Medicare Advantage    August 20, 2018

## (undated) NOTE — IP AVS SNAPSHOT
Patient Demographics     Address  [facility] [address] Phone  [phone] (Home) *Preferred*  [phone] [facility])      Emergency Contact(s)     Name Relation Home Work Dustin Matute [phone] TAKE 2 CAPS THE DAY PRIOR TO SURGERY, 2 CAPS THE MORNING OF SURGERY WITH A SIP OF WATER, 1 CAP DAILY AFTER SURGERY   Karlee Lemus MD         docusate sodium 100 MG Caps  Commonly known as:  COLACE  Next dose due:  TONIGHT      Take 100 mg by mout [id number] Senna (SENOKOT) tab TABS 17.2 mg 08/19/18 2012 Given      [id number] TraMADol HCl (ULTRAM) tab 50 mg 08/19/18 2147 Given      [id number] aspirin tab 325 mg 08/19/18 2012 Given      [id number] aspirin tab 325 mg 08/20/18 0821 Given      [id number] at BUN/CREA Ratio 16.7 10.0 - 20.0 — [facility]   Anion Gap 7 0 - 18 mmol/L — [facility]   Calculated Osmolality 276 275 - 295 mOsm/kg — [facility]   GFR, Non-African American >60 >=60 — [facility]   GFR, African-American >60 >=60 [employer]   Com see below for details    S/P Left Total Hip Arthroplasty  -IV/PO prn pain meds.   Monitor mental status and vitals closely  -expected acute blood loss anemia,hemoglobin 2016 per outpatient chart review 14  -lynn  -Bowel reg, antiemetics  -DVT Prophy-[GB.1] EXTREMITIES: left hip[GB. 1] dressing D/I,[GB. 2] no edema; no cyanosis;, no calf tenderness; peripheral pulses intact     PMH[GB.1]  Past Medical History:   Diagnosis Date   • Anxiety state    • Cataract    • High cholesterol    • Osteoarthritis    • Pneumo MG, PHOS in the last 168 hours. No results for input(s): ALT, AST, ALB, AMYLASE, LIPASE, LDH in the last 168 hours. Invalid input(s): ALPHOS, TBIL, DBIL, TPROT    No results for input(s): TROP in the last 168 hours. [GB.3]    Radiology:[GB.1] Xr Hip W Electronically signed by Kassy Troy MD on 8/16/2018  2:35 PM   Attribution Key    GB. 1 - Purnima Healy, NP on 8/16/2018 12:38 PM  GB. 2 - Loney Snellen, NP on 8/16/2018 12:58 PM  GB. 3 - Loney Snellen, NP on 8/16/2018 12:42 PM  TN. 1 - Kassy Troy MD on OA who presents for hip surgery. Pt is S/P Left Total Hip Arthroplasty[GB. 1]    Reading a news paper. No pain in left hip, leg still feels a little numb. No CP, SOB, abdominal pain, N/V. Tolerated po. Ohara in place. Lives alone.  Wants to go to rehab, inte capsule the morning of surgery, 1 capsule at bedtime each evening after surgery. Disp: 32 capsule Rfl: 0   Probiotic Product (PROBIOTIC OR) Take by mouth.  Disp:  Rfl:    atorvastatin 10 MG Oral Tab TAKE 1 TABLET(10 MG) BY MOUTH DAILY Disp: 90 tablet Rfl: 3 Physical Therapy Notes (last 72 hours) (Notes from 8/17/2018 12:34 PM through 8/20/2018 12:34 PM)      Physical Therapy Note signed by Alma Cash, PT at 8/19/2018  1:18 PM  Version 1 of 1    Author:  Alma Cash, PT Service:  (none) Author How much difficulty does the patient currently have. ..  -   Turning over in bed (including adjusting bedclothes, sheets and blankets)?: A Little   -   Sitting down on and standing up from a chair with arms (e.g., wheelchair, bedside commode, etc.): A Patotl Goal #3 Patient is able to ambulate 300 feet with assistive device at assistance level: modified independent    Goal #3   Current Status Amb 180 ft x 1w/ RW & CGA   Goal #4 Patient independently performs home exercise program for ROM/strengthening per the Version 1 of 1    Author:  Jana Porras Service:  (none) Author Type:  Physical Therapist    Filed:  8/18/2018  4:11 PM Date of Service:  8/18/2018  3:54 PM Status:  Signed    :  Jana Porras (Physical Therapist)       PHYSICAL THERAPY HIP CRISTINA PAIN ASSESSMENT   Rating: 3  Location: L hip  Management Techniques:  Body mechanics;Breathing techniques;Repositioning    BALANCE  Static Sitting: Good  Dynamic Sitting: Good  Static Standing: Fair +  Dynamic Standin [facility] air Goal #1 Patient is able to demonstrate supine - sit EOB @ level: modified independent   Goal #1   Current Status CGA   Goal #2 Patient is able to demonstrate transfers Sit to/from Stand at assistance level: modified independent     Goal #2  Current Status Pt completed transfers w/ Min A sit <> stand, Patient amb 120 ft x 2 w/ RW & CGA in pm. patient O2 saturation fell to 86% w/ activity on 2 LO2; no SOB noted. RN notified.  At the end of the session, pt was left sitting in recliner chair w all needs in reach CMS Modifier (G-Code): CK[DK. 2]    FUNCTIONAL ABILITY STATUS  Gait Assessment[DK. 1]   Gait Assistance: Minimum assistance  Distance (ft): 120 x 2  Assistive Device: Rolling walker  Pattern: L Decreased stance time  Stoop/Curb Assistance: Not tested  Commen Occupational Therapy Notes (last 72 hours) (Notes from 8/17/2018 12:34 PM through 8/20/2018 12:34 PM)      Occupational Therapy Note signed by Latha Velez OT at 8/20/2018 11:55 AM  Version 1 of 1    Author:  Latha Velez OT Service:  Rehab Author Type:  Alex Montgomery Weight Bearing Restriction: L lower extremity           L Lower Extremity: Weight Bearing as Tolerated    PAIN ASSESSMENT  Ratin           ACTIVITY TOLERANCE  1LNC at rest: 92-93%  1LNC with activity: 88%, return to 92-93% within 20 seconds     ACTIVIT MS.1 - Rosemary Christopher, OT on 8/20/2018 11:49 AM  MS. 2 - Rosemary Christopher, OT on 8/20/2018 11:55 AM                     Video Swallow Study Notes     No notes of this type exist for this encounter. SLP Notes     No notes of this type exist for this encounter.